# Patient Record
Sex: MALE | Race: BLACK OR AFRICAN AMERICAN | Employment: FULL TIME | URBAN - METROPOLITAN AREA
[De-identification: names, ages, dates, MRNs, and addresses within clinical notes are randomized per-mention and may not be internally consistent; named-entity substitution may affect disease eponyms.]

---

## 2021-10-28 ENCOUNTER — OFFICE VISIT (OUTPATIENT)
Dept: CARDIOLOGY CLINIC | Age: 51
End: 2021-10-28
Payer: COMMERCIAL

## 2021-10-28 VITALS
SYSTOLIC BLOOD PRESSURE: 140 MMHG | HEART RATE: 67 BPM | DIASTOLIC BLOOD PRESSURE: 88 MMHG | OXYGEN SATURATION: 99 % | BODY MASS INDEX: 34.72 KG/M2 | WEIGHT: 256 LBS

## 2021-10-28 DIAGNOSIS — I42.2 HYPERTROPHIC NONOBSTRUCTIVE CARDIOMYOPATHY (HCC): Primary | ICD-10-CM

## 2021-10-28 DIAGNOSIS — Q24.5: ICD-10-CM

## 2021-10-28 DIAGNOSIS — I10 ESSENTIAL HYPERTENSION: ICD-10-CM

## 2021-10-28 PROCEDURE — 99204 OFFICE O/P NEW MOD 45 MIN: CPT | Performed by: INTERNAL MEDICINE

## 2021-10-28 PROCEDURE — 93000 ELECTROCARDIOGRAM COMPLETE: CPT | Performed by: INTERNAL MEDICINE

## 2021-10-28 RX ORDER — AMLODIPINE AND OLMESARTAN MEDOXOMIL 5; 20 MG/1; MG/1
5-20 TABLET ORAL DAILY
COMMUNITY
End: 2022-05-06

## 2021-10-28 RX ORDER — INDOMETHACIN 75 MG/1
75 CAPSULE, EXTENDED RELEASE ORAL
COMMUNITY
End: 2022-05-06

## 2021-10-28 NOTE — PATIENT INSTRUCTIONS
Follow up with Dr Akash Cardoza with EKG in 1 year  ECHO - If you have not heard from the central scheduler to schedule your testing in 48 hours, please call [phone].

## 2021-10-28 NOTE — PROGRESS NOTES
Deo Barroso presents today for No chief complaint on file. Deo Barroso preferred language for health care discussion is english/other. Is someone accompanying this pt? no    Is the patient using any DME equipment during [address]? no    Depression Screening:  3 most recent PHQ Screens 10/28/2021   Little interest or pleasure in doing things Not at all   Feeling down, depressed, irritable, or hopeless Not at all   Total Score PHQ 2 0       Learning Assessment:  No flowsheet data found. Abuse Screening:  No flowsheet data found. Fall Risk  No flowsheet data found. Pt currently taking Anticoagulant therapy? no    Coordination of Care:  1. Have you been to the ER, urgent care clinic since your last visit? Hospitalized since your last visit? no    2. Have you seen or consulted any other health care providers outside of the [address] [place] [address] since your last visit? Include any pap smears or colon screening.  no

## 2021-10-28 NOTE — PROGRESS NOTES
HISTORY OF PRESENT ILLNESS  Ceci Grimaldo is a 46 y.o. male. HPI    Patient presents for a new office visit. He has a past medical history significant for hypertrophic nonobstructive cardiomyopathy initially diagnosed back in 2001 by echocardiogram.  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 The left main and left circumflex originated from the left coronary sinus. He had a normal-appearing right coronary artery, codominant in nature. He has not been evaluated by a cardiologist in nearly 10 years. He is retired from the [employer] and currently resides in [place] working for the [employer]. He states he did undergo a stress echocardiogram 1 to 2 years ago as part of a pre-employment physical which was reportedly normal.    He returns here today at the request of his PCP since he has not been evaluated in nearly 10 years by a cardiologist.  Patient states he was recently started on amlodipine/olmesartan by his PCP last week for elevated blood pressure. He reports his blood pressure has been better controlled since starting this medication. He denies any decreased activity tolerance, no exertional chest pain, exertional dyspnea, dizziness, nor syncope. He denies any heart palpitations. Past Medical History:   Diagnosis Date    Abnormal EKG     Abnormal nuclear cardiac imaging test 11/05/2001    No evidence of ischemia. Minimal anterior scar or artifact. Pos EKG on max EST. Ex time 12:29.      Gout     History of echocardiogram 05/23/2012    Tech difficult. EF 60-65%. No WMA. Mod LVH. Mild LAE. No significant valvular heart disease.  Hypertrophic nonobstructive cardiomyopathy ([address]     S/P cardiac cath 10/31/2001    Anomalous LAD. 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 Patent coronary arteries. LVEDP 25.  EF 65%. RA 7.  RV 41/7. PA 30/14. W 12.  CO 5.6.  Tilt table evaluation 11/02/2001    Negative study in resting state.  Vitamin D deficiency      Current Outpatient Medications   Medication Sig Dispense Refill    indomethacin SR (INDOCIN SR) 75 mg SR capsule Take 75 mg by mouth daily as needed.  amLODIPine-Olmesartan 5-20 mg tab Take 5-20 mg by mouth daily. No Known Allergies     Social History     Tobacco Use    Smoking status: Never Smoker    Smokeless tobacco: Never Used   Substance Use Topics    Alcohol use: Yes     Comment: Socially    Drug use: No     Family History   Problem Relation Age of Onset    Hypertension Maternal Grandmother    Daughter with hypertrophic cardiomyopathy requiring AICD. Review of Systems   Constitutional: Negative for chills, fever and weight loss. HENT: Negative for nosebleeds. Eyes: Negative for blurred vision and double vision. Respiratory: Negative for cough, shortness of breath and wheezing. Cardiovascular: Negative for chest pain, palpitations, orthopnea, claudication, leg swelling and PND. Gastrointestinal: Negative for abdominal pain, heartburn, nausea and vomiting. Genitourinary: Negative for dysuria and hematuria. Musculoskeletal: Negative for falls and myalgias. Skin: Negative for rash. Neurological: Negative for dizziness, focal weakness and headaches. Endo/Heme/Allergies: Does not bruise/bleed easily. Psychiatric/Behavioral: Negative for substance abuse. Visit Vitals  BP (!) 140/88   Pulse 67   Wt 116.1 kg (256 lb)   SpO2 99%   BMI 34.72 kg/m²         Physical Exam  Constitutional:       Appearance: He is well-developed. HENT:      Head: Normocephalic and atraumatic. Eyes:      Conjunctiva/sclera: Conjunctivae normal.   Neck:      Vascular: No carotid bruit or JVD. Cardiovascular:      Rate and Rhythm: Normal rate and regular rhythm. Pulses: Normal pulses. Heart sounds: S1 normal and S2 normal. No murmur heard. No gallop. No S3 sounds.     Pulmonary: Breath sounds: Normal breath sounds. No wheezing or rales. Abdominal:      General: Bowel sounds are normal.      Palpations: Abdomen is soft. Tenderness: There is no abdominal tenderness. Musculoskeletal:         General: No swelling or deformity. Cervical back: Neck supple. Skin:     General: Skin is warm and dry. Neurological:      General: No focal deficit present. Mental Status: He is alert and oriented to person, place, and time. Psychiatric:         Mood and Affect: Mood normal.         Behavior: Behavior normal.       EKG: Normal sinus rhythm, normal axis, first-degree AV block, possible left atrial enlargement, borderline voltage criteria for LVH, diffuse nonspecific ST-T abnormalities, likely due to repolarization changes. Prior to the previous EKG, the ST-T changes are less prominent. ASSESSMENT and PLAN  Encounter Diagnoses   Name Primary? 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 This was initially diagnosed 20 years ago. Patient remains asymptomatic. He does not have a heart murmur on exam today. I have recommended repeating a full resting echocardiogram to reevaluate his wall thickness and assess for any valvular heart disease. Essential hypertension. Patient's blood pressure has been elevated recently and he was started on amlodipine/olmesartan by his PCP. His blood pressure has been better controlled on this medication. I would continue this regimen. Anomalous origin of his LAD. This was an incidental finding during a cardiac catheterization in 2001. Patient denies any anginal type symptoms. Weight gain. Patient reports a 15 pound weight gain over the past 4 months.   He was encouraged to try and lose much weight as possible lifestyle modification. Follow-up annually, sooner if needed. Calcipotriene Counseling:  I discussed with the patient the risks of calcipotriene including but not limited to erythema, scaling, itching, and irritation.

## 2021-11-18 ENCOUNTER — HOSPITAL ENCOUNTER (OUTPATIENT)
Dept: NON INVASIVE DIAGNOSTICS | Age: 51
Discharge: HOME | End: 2021-11-18
Payer: COMMERCIAL

## 2021-11-18 VITALS
WEIGHT: 256 LBS | BODY MASS INDEX: 34.67 KG/M2 | HEIGHT: 72 IN | DIASTOLIC BLOOD PRESSURE: 88 MMHG | SYSTOLIC BLOOD PRESSURE: 140 MMHG

## 2021-11-18 DIAGNOSIS — I42.2 HYPERTROPHIC NONOBSTRUCTIVE CARDIOMYOPATHY (HCC): ICD-10-CM

## 2021-11-18 PROCEDURE — 93306 TTE W/DOPPLER COMPLETE: CPT | Performed by: SPECIALIST

## 2021-11-18 PROCEDURE — 93306 TTE W/DOPPLER COMPLETE: CPT

## 2021-11-20 LAB
ECHO AO ROOT DIAM: 3.4 CM
ECHO AV AREA PEAK VELOCITY: 2.98 CM2
ECHO AV AREA/BSA PEAK VELOCITY: 1.3 CM2/M2
ECHO AV PEAK GRADIENT: 6.99 MMHG
ECHO AV PEAK VELOCITY: 132.19 CM/S
ECHO LA AREA 4C: 32.68 CM2
ECHO LA MAJOR AXIS: 4.61 CM
ECHO LA MINOR AXIS: 1.95 CM
ECHO LA VOL 2C: 141.44 ML (ref 18–58)
ECHO LA VOL 4C: 121.21 ML (ref 18–58)
ECHO LA VOL BP: 139.94 ML (ref 18–58)
ECHO LA VOL/BSA BIPLANE: 59.05 ML/M2 (ref 16–28)
ECHO LA VOLUME INDEX A2C: 59.68 ML/M2 (ref 16–28)
ECHO LA VOLUME INDEX A4C: 51.14 ML/M2 (ref 16–28)
ECHO LV INTERNAL DIMENSION DIASTOLIC: 4.78 CM (ref 4.2–5.9)
ECHO LV INTERNAL DIMENSION SYSTOLIC: 3.03 CM
ECHO LV IVSD: 0.94 CM (ref 0.6–1)
ECHO LV IVSS: 1.18 CM
ECHO LV MASS 2D: 191.5 G (ref 88–224)
ECHO LV MASS INDEX 2D: 80.8 G/M2 (ref 49–115)
ECHO LV POSTERIOR WALL DIASTOLIC: 1.25 CM (ref 0.6–1)
ECHO LV POSTERIOR WALL SYSTOLIC: 1.15 CM
ECHO LVOT DIAM: 2.06 CM
ECHO LVOT PEAK GRADIENT: 5.6 MMHG
ECHO LVOT PEAK VELOCITY: 118.34 CM/S
ECHO MV A VELOCITY: 55.56 CM/S
ECHO MV E DECELERATION TIME (DT): 209.84 MS
ECHO MV E VELOCITY: 66.96 CM/S
ECHO MV E/A RATIO: 1.21
ECHO PV PEAK INSTANTANEOUS GRADIENT SYSTOLIC: 9.21 MMHG
ECHO RV INTERNAL DIMENSION: 4.58 CM
ECHO RV TAPSE: 2.46 CM (ref 1.5–2)
LA VOL DISK BP: 129.71 ML (ref 18–58)

## 2021-12-01 ENCOUNTER — TELEPHONE (OUTPATIENT)
Dept: CARDIOLOGY CLINIC | Age: 51
End: 2021-12-01

## 2021-12-01 NOTE — TELEPHONE ENCOUNTER
Patient made aware of echo results and Dr. Jazmyne Dietrich remarks. No questions or concerns at present.

## 2021-12-01 NOTE — TELEPHONE ENCOUNTER
----- Message from Hector Manriquez MD sent at 11/29/2021  5:22 PM EST -----  Please let the patient know that his echocardiogram was normal.  ----- Message -----  From: Lee Quick MD  Sent: 11/20/2021   6:28 PM EST  To: Hector Manriquez MD

## 2022-03-18 PROBLEM — I42.2 HYPERTROPHIC NONOBSTRUCTIVE CARDIOMYOPATHY (HCC): Status: ACTIVE | Noted: 2021-10-28

## 2022-03-19 PROBLEM — Q24.5: Status: ACTIVE | Noted: 2021-10-28

## 2022-03-19 PROBLEM — I10 ESSENTIAL HYPERTENSION: Status: ACTIVE | Noted: 2021-10-28

## 2022-05-04 ENCOUNTER — APPOINTMENT (OUTPATIENT)
Dept: CT IMAGING | Age: 52
DRG: 699 | End: 2022-05-04
Attending: FAMILY MEDICINE
Payer: COMMERCIAL

## 2022-05-04 ENCOUNTER — HOSPITAL ENCOUNTER (INPATIENT)
Age: 52
LOS: 2 days | Discharge: HOME | DRG: 699 | End: 2022-05-06
Attending: EMERGENCY MEDICINE | Admitting: FAMILY MEDICINE
Payer: COMMERCIAL

## 2022-05-04 DIAGNOSIS — E11.65 TYPE 2 DIABETES MELLITUS WITH HYPERGLYCEMIA, WITHOUT LONG-TERM CURRENT USE OF INSULIN (HCC): ICD-10-CM

## 2022-05-04 DIAGNOSIS — E87.5 ACUTE HYPERKALEMIA: Primary | ICD-10-CM

## 2022-05-04 DIAGNOSIS — N17.9 AKI (ACUTE KIDNEY INJURY) (HCC): ICD-10-CM

## 2022-05-04 LAB
ALBUMIN SERPL-MCNC: 3.2 G/DL (ref 3.5–5)
ALBUMIN SERPL-MCNC: 3.3 G/DL (ref 3.5–5)
ALBUMIN SERPL-MCNC: 3.8 G/DL (ref 3.5–5)
ALBUMIN/GLOB SERPL: 0.7 {RATIO} (ref 1.1–2.2)
ALBUMIN/GLOB SERPL: 0.8 {RATIO} (ref 1.1–2.2)
ALBUMIN/GLOB SERPL: 0.8 {RATIO} (ref 1.1–2.2)
ALP SERPL-CCNC: 376 U/L (ref 45–117)
ALP SERPL-CCNC: 395 U/L (ref 45–117)
ALP SERPL-CCNC: 430 U/L (ref 45–117)
ALT SERPL-CCNC: 36 U/L (ref 12–78)
ALT SERPL-CCNC: 42 U/L (ref 12–78)
ALT SERPL-CCNC: 44 U/L (ref 12–78)
ANION GAP SERPL CALC-SCNC: 10 MMOL/L (ref 5–15)
ANION GAP SERPL CALC-SCNC: 14 MMOL/L (ref 5–15)
ANION GAP SERPL CALC-SCNC: 9 MMOL/L (ref 5–15)
APPEARANCE UR: CLEAR
AST SERPL-CCNC: 21 U/L (ref 15–37)
AST SERPL-CCNC: 28 U/L (ref 15–37)
AST SERPL-CCNC: 54 U/L (ref 15–37)
BACTERIA URNS QL MICRO: NEGATIVE /HPF
BASOPHILS # BLD: 0.1 K/UL (ref 0–0.1)
BASOPHILS NFR BLD: 1 % (ref 0–1)
BILIRUB SERPL-MCNC: 0.5 MG/DL (ref 0.2–1)
BILIRUB SERPL-MCNC: 0.5 MG/DL (ref 0.2–1)
BILIRUB SERPL-MCNC: 0.6 MG/DL (ref 0.2–1)
BILIRUB UR QL: NEGATIVE
BUN SERPL-MCNC: 32 MG/DL (ref 6–20)
BUN SERPL-MCNC: 35 MG/DL (ref 6–20)
BUN SERPL-MCNC: 39 MG/DL (ref 6–20)
BUN/CREAT SERPL: 19 (ref 12–20)
BUN/CREAT SERPL: 20 (ref 12–20)
BUN/CREAT SERPL: 21 (ref 12–20)
CALCIUM SERPL-MCNC: 8.2 MG/DL (ref 8.5–10.1)
CALCIUM SERPL-MCNC: 9.2 MG/DL (ref 8.5–10.1)
CALCIUM SERPL-MCNC: 9.6 MG/DL (ref 8.5–10.1)
CHLORIDE SERPL-SCNC: 102 MMOL/L (ref 97–108)
CHLORIDE SERPL-SCNC: 106 MMOL/L (ref 97–108)
CHLORIDE SERPL-SCNC: 98 MMOL/L (ref 97–108)
CK SERPL-CCNC: 147 U/L (ref 39–308)
CO2 SERPL-SCNC: 17 MMOL/L (ref 21–32)
CO2 SERPL-SCNC: 19 MMOL/L (ref 21–32)
CO2 SERPL-SCNC: 19 MMOL/L (ref 21–32)
COLOR UR: ABNORMAL
COMMENT, HOLDF: NORMAL
CREAT SERPL-MCNC: 1.49 MG/DL (ref 0.7–1.3)
CREAT SERPL-MCNC: 1.85 MG/DL (ref 0.7–1.3)
CREAT SERPL-MCNC: 1.99 MG/DL (ref 0.7–1.3)
DIFFERENTIAL METHOD BLD: NORMAL
EOSINOPHIL # BLD: 0.2 K/UL (ref 0–0.4)
EOSINOPHIL NFR BLD: 2 % (ref 0–7)
EPITH CASTS URNS QL MICRO: ABNORMAL /LPF
ERYTHROCYTE [DISTWIDTH] IN BLOOD BY AUTOMATED COUNT: 13.2 % (ref 11.5–14.5)
EST. AVERAGE GLUCOSE BLD GHB EST-MCNC: 280 MG/DL
GLOBULIN SER CALC-MCNC: 4.2 G/DL (ref 2–4)
GLOBULIN SER CALC-MCNC: 4.7 G/DL (ref 2–4)
GLOBULIN SER CALC-MCNC: 4.8 G/DL (ref 2–4)
GLUCOSE BLD STRIP.AUTO-MCNC: 300 MG/DL (ref 65–117)
GLUCOSE BLD STRIP.AUTO-MCNC: 377 MG/DL (ref 65–117)
GLUCOSE BLD STRIP.AUTO-MCNC: 388 MG/DL (ref 65–117)
GLUCOSE SERPL-MCNC: 254 MG/DL (ref 65–100)
GLUCOSE SERPL-MCNC: 397 MG/DL (ref 65–100)
GLUCOSE SERPL-MCNC: 461 MG/DL (ref 65–100)
GLUCOSE UR STRIP.AUTO-MCNC: >1000 MG/DL
HBA1C MFR BLD: 11.4 % (ref 4–5.6)
HCT VFR BLD AUTO: 45.8 % (ref 36.6–50.3)
HGB BLD-MCNC: 14.9 G/DL (ref 12.1–17)
HGB UR QL STRIP: NEGATIVE
IMM GRANULOCYTES # BLD AUTO: 0 K/UL (ref 0–0.04)
IMM GRANULOCYTES NFR BLD AUTO: 0 % (ref 0–0.5)
KETONES UR QL STRIP.AUTO: 15 MG/DL
LEUKOCYTE ESTERASE UR QL STRIP.AUTO: NEGATIVE
LYMPHOCYTES # BLD: 1.7 K/UL (ref 0.8–3.5)
LYMPHOCYTES NFR BLD: 20 % (ref 12–49)
MAGNESIUM SERPL-MCNC: 2.2 MG/DL (ref 1.6–2.4)
MCH RBC QN AUTO: 27.1 PG (ref 26–34)
MCHC RBC AUTO-ENTMCNC: 32.5 G/DL (ref 30–36.5)
MCV RBC AUTO: 83.4 FL (ref 80–99)
MONOCYTES # BLD: 0.8 K/UL (ref 0–1)
MONOCYTES NFR BLD: 10 % (ref 5–13)
NEUTS SEG # BLD: 5.6 K/UL (ref 1.8–8)
NEUTS SEG NFR BLD: 67 % (ref 32–75)
NITRITE UR QL STRIP.AUTO: NEGATIVE
NRBC # BLD: 0 K/UL (ref 0–0.01)
NRBC BLD-RTO: 0 PER 100 WBC
PH UR STRIP: 5 [PH] (ref 5–8)
PHOSPHATE SERPL-MCNC: 3.1 MG/DL (ref 2.6–4.7)
PLATELET # BLD AUTO: 198 K/UL (ref 150–400)
PMV BLD AUTO: NORMAL FL (ref 8.9–12.9)
POTASSIUM SERPL-SCNC: 4.1 MMOL/L (ref 3.5–5.1)
POTASSIUM SERPL-SCNC: 5.7 MMOL/L (ref 3.5–5.1)
POTASSIUM SERPL-SCNC: 7.6 MMOL/L (ref 3.5–5.1)
PROT SERPL-MCNC: 7.5 G/DL (ref 6.4–8.2)
PROT SERPL-MCNC: 7.9 G/DL (ref 6.4–8.2)
PROT SERPL-MCNC: 8.6 G/DL (ref 6.4–8.2)
PROT UR STRIP-MCNC: ABNORMAL MG/DL
RBC # BLD AUTO: 5.49 M/UL (ref 4.1–5.7)
RBC #/AREA URNS HPF: ABNORMAL /HPF (ref 0–5)
RBC MORPH BLD: NORMAL
SAMPLES BEING HELD,HOLD: NORMAL
SERVICE CMNT-IMP: ABNORMAL
SODIUM SERPL-SCNC: 129 MMOL/L (ref 136–145)
SODIUM SERPL-SCNC: 130 MMOL/L (ref 136–145)
SODIUM SERPL-SCNC: 135 MMOL/L (ref 136–145)
SP GR UR REFRACTOMETRY: 1.02 (ref 1–1.03)
UR CULT HOLD, URHOLD: NORMAL
UROBILINOGEN UR QL STRIP.AUTO: 0.2 EU/DL (ref 0.2–1)
WBC # BLD AUTO: 8.4 K/UL (ref 4.1–11.1)
WBC URNS QL MICRO: ABNORMAL /HPF (ref 0–4)

## 2022-05-04 PROCEDURE — 83036 HEMOGLOBIN GLYCOSYLATED A1C: CPT

## 2022-05-04 PROCEDURE — 82550 ASSAY OF CK (CPK): CPT

## 2022-05-04 PROCEDURE — 82962 GLUCOSE BLOOD TEST: CPT

## 2022-05-04 PROCEDURE — 80053 COMPREHEN METABOLIC PANEL: CPT

## 2022-05-04 PROCEDURE — 36415 COLL VENOUS BLD VENIPUNCTURE: CPT

## 2022-05-04 PROCEDURE — 81001 URINALYSIS AUTO W/SCOPE: CPT

## 2022-05-04 PROCEDURE — 99285 EMERGENCY DEPT VISIT HI MDM: CPT

## 2022-05-04 PROCEDURE — 74011250636 HC RX REV CODE- 250/636: Performed by: FAMILY MEDICINE

## 2022-05-04 PROCEDURE — 83735 ASSAY OF MAGNESIUM: CPT

## 2022-05-04 PROCEDURE — 74176 CT ABD & PELVIS W/O CONTRAST: CPT

## 2022-05-04 PROCEDURE — 93005 ELECTROCARDIOGRAM TRACING: CPT

## 2022-05-04 PROCEDURE — 85025 COMPLETE CBC W/AUTO DIFF WBC: CPT

## 2022-05-04 PROCEDURE — 96375 TX/PRO/DX INJ NEW DRUG ADDON: CPT

## 2022-05-04 PROCEDURE — 65270000046 HC RM TELEMETRY

## 2022-05-04 PROCEDURE — 84100 ASSAY OF PHOSPHORUS: CPT

## 2022-05-04 PROCEDURE — 96365 THER/PROPH/DIAG IV INF INIT: CPT

## 2022-05-04 PROCEDURE — 74011000250 HC RX REV CODE- 250: Performed by: FAMILY MEDICINE

## 2022-05-04 PROCEDURE — 74011636637 HC RX REV CODE- 636/637: Performed by: FAMILY MEDICINE

## 2022-05-04 RX ORDER — SODIUM CHLORIDE 0.9 % (FLUSH) 0.9 %
5-40 SYRINGE (ML) INJECTION EVERY 8 HOURS
Status: DISCONTINUED | OUTPATIENT
Start: 2022-05-04 | End: 2022-05-06 | Stop reason: HOSPADM

## 2022-05-04 RX ORDER — SODIUM POLYSTYRENE SULFONATE 15 G/60ML
30 SUSPENSION ORAL; RECTAL
Status: ACTIVE | OUTPATIENT
Start: 2022-05-04 | End: 2022-05-05

## 2022-05-04 RX ORDER — MAGNESIUM SULFATE 100 %
4 CRYSTALS MISCELLANEOUS AS NEEDED
Status: DISCONTINUED | OUTPATIENT
Start: 2022-05-04 | End: 2022-05-06 | Stop reason: HOSPADM

## 2022-05-04 RX ORDER — CALCIUM GLUCONATE 94 MG/ML
1 INJECTION, SOLUTION INTRAVENOUS
Status: DISCONTINUED | OUTPATIENT
Start: 2022-05-04 | End: 2022-05-04

## 2022-05-04 RX ORDER — SODIUM CHLORIDE 0.9 % (FLUSH) 0.9 %
5-40 SYRINGE (ML) INJECTION AS NEEDED
Status: DISCONTINUED | OUTPATIENT
Start: 2022-05-04 | End: 2022-05-06 | Stop reason: HOSPADM

## 2022-05-04 RX ORDER — INSULIN LISPRO 100 [IU]/ML
INJECTION, SOLUTION INTRAVENOUS; SUBCUTANEOUS EVERY 6 HOURS
Status: DISCONTINUED | OUTPATIENT
Start: 2022-05-04 | End: 2022-05-05

## 2022-05-04 RX ORDER — CALCIUM GLUCONATE 20 MG/ML
1 INJECTION, SOLUTION INTRAVENOUS ONCE
Status: COMPLETED | OUTPATIENT
Start: 2022-05-04 | End: 2022-05-04

## 2022-05-04 RX ADMIN — Medication 10 UNITS: at 17:21

## 2022-05-04 RX ADMIN — Medication 10 UNITS: at 15:03

## 2022-05-04 RX ADMIN — SODIUM CHLORIDE 1000 ML: 9 INJECTION, SOLUTION INTRAVENOUS at 15:05

## 2022-05-04 RX ADMIN — SODIUM BICARBONATE: 84 INJECTION, SOLUTION INTRAVENOUS at 20:28

## 2022-05-04 RX ADMIN — CALCIUM GLUCONATE 1000 MG: 20 INJECTION, SOLUTION INTRAVENOUS at 17:22

## 2022-05-04 RX ADMIN — SODIUM CHLORIDE 1000 ML: 9 INJECTION, SOLUTION INTRAVENOUS at 13:42

## 2022-05-04 NOTE — ED NOTES
Bedside and Verbal shift change report given to Gage RN (oncoming nurse) by Yamel Minor RN (offgoing nurse). Report included the following information SBAR, ED Summary, MAR and Recent Results.

## 2022-05-04 NOTE — H&P
[address] [facility] Adult  Hospitalist Group  History and Physical    Date of Service:  5/4/2022  Primary Care Provider: Denise Wilcox MD  Source of information: The patient and Chart review    Chief Complaint: High Blood Sugar      History of Presenting Illness:   Little Dykes is a 46 y.o. male who presents with abnormal labs    History was primarily obtained from the patient    Patient reports that he started having some weakness, he gained some weight, has polydipsia and polyuria, started feeling poorly, got concerned and [facility], was found to have elevated blood glucose and was told to come to the ER. Meryle Sandman Patient reports he was diagnosed with diabetes but was able to control it with lifestyle modification, reports that in last November his hemoglobin A1c was 6 and his fasting glucose was within normal limits, he was taken off all medications, reports that in the last 3 months, he has been noncompliant with diet and exercise modification, reports that he retired and has been eating quite a bit, reports that he has gained 20 pounds,. Came to the ER today, was found to be hyperglycemic and in acute renal failure and was requested to be admitted to the hospitalist service         REVIEW OF SYSTEMS:  A comprehensive review of systems was negative except for that written in the History of Present Illness. Past Medical History:   Diagnosis Date    Abnormal EKG     Abnormal nuclear cardiac imaging test 11/05/2001    No evidence of ischemia. Minimal anterior scar or artifact. Pos EKG on max EST. Ex time 12:29.      Gout     History of echocardiogram 05/23/2012    Tech difficult. EF 60-65%. No WMA. Mod LVH. Mild LAE. No significant valvular heart disease. 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 Patent coronary arteries. LVEDP 25.  EF 65%. RA 7.  RV 41/7. PA 30/14. W 12.  CO 5.6.  Tilt table evaluation 11/02/2001    Negative study in resting state.  Vitamin D deficiency       Past Surgical History:   Procedure Laterality Date    HX CHOLECYSTECTOMY  April 2001    HX HEART CATHETERIZATION  10/31/2011    HX OTHER SURGICAL  1983    Right knee surgery    [place] REMOVAL GALLBL[place]DER  4/2000     Prior to Admission medications    Medication Sig Start Date End Date Taking? Authorizing Provider   indomethacin SR (INDOCIN SR) 75 mg SR capsule Take 75 mg by mouth daily as needed. Provider, Historical   amLODIPine-Olmesartan 5-20 mg tab Take 5-20 mg by mouth daily. Provider, Historical     No Known Allergies   Family History   Problem Relation Age of Onset    Hypertension Maternal Grandmother       Social History:  reports that he has never smoked. He has never used smokeless tobacco. He reports current alcohol use. He reports that he does not use drugs. Family and social history were personally reviewed, all pertinent and relevant details are outlined as above. Objective:     Visit Vitals  BP (!) 152/86   Pulse 68   Temp 98.1 °F (36.7 °C)   Resp 14   Ht 6' (1.829 m)   Wt 113.4 kg (250 lb)   SpO2 100%   BMI 33.91 kg/m²      O2 Device: None (Room air)    PHYSICAL EXAM:   General: Alert x oriented x 3, awake, no acute distress, resting in bed, pleasant male, appears to be stated age  HEENT: PEERL, EOMI, moist mucus membranes  Neck: Supple, no JVD, no meningeal signs  Chest: Clear to auscultation bilaterally   CVS: RRR, S1 S2 heard, no murmurs/rubs/gallops  Abd: Soft, non-tender, non-distended, +bowel sounds   Ext: No clubbing, no cyanosis, no edema  Neuro/Psych: No focal neurological deficit  Cap refill: Brisk, less than 3 seconds  Pulses: 2+, symmetric in all extremities  Skin: Warm, dry, without rashes or lesions    Data Review: All diagnostic labs and studies have been reviewed.     Abnormal Labs Reviewed   METABOLIC PANEL, COMPREHENSIVE - Abnormal; Notable for the following components:       Result Value    Sodium 129 (*)     Potassium 5.7 (*)     CO2 17 (*)     Glucose 461 (*)     BUN 39 (*)     Creatinine 1.99 (*)     GFR est AA 43 (*)     GFR est non-AA 35 (*)     Alk. phosphatase 430 (*)     Protein, total 8.6 (*)     Globulin 4.8 (*)     A-G Ratio 0.8 (*)     All other components within normal limits   URINALYSIS W/MICROSCOPIC - Abnormal; Notable for the following components:    Protein TRACE (*)     Glucose >1,000 (*)     Ketone 15 (*)     All other components within normal limits   METABOLIC PANEL, COMPREHENSIVE - Abnormal; Notable for the following components:    Sodium 130 (*)     Potassium 7.6 (*)     CO2 19 (*)     Glucose 397 (*)     BUN 35 (*)     Creatinine 1.85 (*)     GFR est AA 47 (*)     GFR est non-AA 38 (*)     Calcium 8.2 (*)     AST (SGOT) 54 (*)     Alk. phosphatase 376 (*)     Albumin 3.2 (*)     Globulin 4.7 (*)     A-G Ratio 0.7 (*)     All other components within normal limits   GLUCOSE, POC - Abnormal; Notable for the following components:    Glucose (POC) 388 (*)     All other components within normal limits   GLUCOSE, POC - Abnormal; Notable for the following components:    Glucose (POC) 377 (*)     All other components within normal limits       All Micro Results     Procedure Component Value Units Date/Time    URINE CULTURE HOLD SAMPLE [[id number]] Collected: 05/04/22 1337    Order Status: Completed Specimen: Urine from Serum Updated: 05/04/22 1342     Urine culture hold       Urine on hold in Microbiology dept for 2 days. If unpreserved urine is submitted, it cannot be used for addtional testing after 24 hours, recollection will be required. IMAGING:   CT ABD PELV WO CONT   Final Result   No acute process on noncontrast CT. No nephrolithiasis or hydronephrosis.            ECG/ECHO:    Results for orders placed or performed during the hospital encounter of 05/04/22   EKG, 12 LEAD, INITIAL   Result Value Ref Range    Ventricular Rate 63 BPM    Atrial Rate 63 BPM    P-R Interval 240 ms    QRS Duration 96 ms    Q-T Interval 402 ms    QTC Calculation (Bezet) 411 ms    Calculated R Axis -13 degrees    Calculated T Axis -179 degrees    Diagnosis       Sinus rhythm with 1st degree AV block  Anterior infarct , age undetermined  T wave abnormality, consider lateral ischemia  Abnormal ECG  No previous ECGs available     Results for orders placed or performed in visit on 10/28/21   AMB POC EKG ROUTINE W/ 12 LEADS, INTER & REP    Impression    See progress note. Assessment:   Given the patient's current clinical presentation, there is a high level of concern for decompensation if discharged from the emergency department. Complex decision making was performed, which includes reviewing the patient's available past medical records, laboratory results, and imaging studies. Acute kidney injury  Hyperkalemia  Diabetes mellitus type 2 with poor control and hypoglycemia  Normal anion gap metabolic acidosis  Accelerated hypertension  Plan:   Patient will be admitted on a telemetry bed  ? Diabetic nephropathy, CT of the abdomen pelvis pending, gentle IV hydration with bicarb GTT, avoid nephrotoxic medication, renally dose all medication, trend creatinine, nephrology consulted by ER, await input content, continue to monitor  Patient received calcium gluconate insulin and dextrose in the ER, also receiving Kayexalate, continue bicarb GTT, recheck BMP, nephrology input awaited, continue to monitor, the need for HD to be decided by nephrology, monitor  Sliding-scale insulin, Accu-Cheks, diet control, close monitoring, further intervention per hospital course  Likely secondary above, patient on bicarb GTT,   hydralazine as needed        DIET: ADULT DIET Regular; 4 carb choices (60 gm/meal); Low Fat/Low Chol/High Fiber/KASI; Low Sodium (2 gm); Low Potassium (Less than 3000 mg/day);  Low Phosphorus (Less than 1000 mg); 60 to 80 gm   ISOLATION PRECAUTIONS: There are currently no Active Isolations  CODE STATUS: Full Code   DVT PROPHYLAXIS: SCDs  FUNCTIONAL STATUS PRIOR TO HOSPITALIZATION: Ambulatory and capable of self-care but relies on assistive devices (rolling walker/cane). EARLY MOBILITY ASSESSMENT: Recommend a consult to the Mobility Team  ANTICIPATED DISCHARGE: 24-48 hours. Signed By: Franklin Avila MD     May 4, 2022         Please note that this dictation may have been completed with Dragon, the computer voice recognition software. Quite often unanticipated grammatical, syntax, homophones, and other interpretive errors are inadvertently transcribed by the computer software. Please disregard these errors. Please excuse any errors that have escaped final proofreading.

## 2022-05-04 NOTE — ED TRIAGE NOTES
[facility] with [facility] BS, no hx of diabetes, does not take steroids, , pt feels thirsty , dehydrated with fatigued, denies fever

## 2022-05-04 NOTE — ED PROVIDER NOTES
Patient is a 22-year-old male with past medical history of hypertension, hypertrophic cardiomyopathy presents for evaluation of elevated blood sugar. He reports that over the past few months, he has been eating unhealthy and has gained some weight, approximately 20 pounds. Over the past few weeks, he noted that he began feeling poorly. Over the past 2 to 3 days, his symptoms worsened. He complains of dry mouth, thirst, frequent urination and generalized feeling unwell. [facility] and was told his blood sugar was elevated and was referred here for further evaluation and management. He reports that in 2016, he had an episode like this where he was diagnosed with diabetes. He was temporarily on insulin, but lost weight and made lifestyle changes and was ultimately taken off of the insulin. He reports that his last physical revealed a A1c of approximately 6 and he has not been on any medication for diabetes. He denies any recent steroid use or new medications. Past Medical History:   Diagnosis Date    Abnormal EKG     Abnormal nuclear cardiac imaging test 11/05/2001    No evidence of ischemia. Minimal anterior scar or artifact. Pos EKG on max EST. Ex time 12:29.      Gout     History of echocardiogram 05/23/2012    Tech difficult. EF 60-65%. No WMA. Mod LVH. Mild LAE. No significant valvular heart disease. 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 Patent coronary arteries. LVEDP 25.  EF 65%. RA 7.  RV 41/7. PA 30/14. W 12.  CO 5.6.  Tilt table evaluation 11/02/2001    Negative study in resting state.     Vitamin D deficiency        Past Surgical History:   Procedure Laterality Date    HX CHOLECYSTECTOMY  April 2001    HX HEART CATHETERIZATION  10/31/2011    HX OTHER SURGICAL  1983    Right knee surgery    [place] REMOVAL GALLBLADDER  4/2000 Family History:   Problem Relation Age of Onset    Hypertension Maternal Grandmother        Social History     Socioeconomic History    Marital status:      Spouse name: Not on file    Number of children: Not on file    Years of education: Not on file    Highest education level: Not on file   Occupational History    Not on file   Tobacco Use    Smoking status: Never Smoker    Smokeless tobacco: Never Used   Substance and Sexual Activity    Alcohol use: Yes     Comment: Socially    Drug use: No    Sexual activity: Not on file   Other Topics Concern    Not on file   Social History Narrative    Not on file     Social Determinants of Health     Financial Resource Strain:     Difficulty of Paying Living Expenses: Not on file   Food Insecurity:     Worried About Running Out of Food in the Last Year: Not on file    Sharif of Food in the Last Year: Not on file   Transportation Needs:     Lack of Transportation (Medical): Not on file    Lack of Transportation (Non-Medical):  Not on file   Physical Activity:     Days of Exercise per Week: Not on file    Minutes of Exercise per Session: Not on file   Stress:     Feeling of Stress : Not on file   Social Connections:     Frequency of Communication with Friends and Family: Not on file    Frequency of Social Gatherings with Friends and Family: Not on file    Attends Restorationism Services: Not on file    Active Member of [address] [place] [address] [place] or Organizations: Not on file    Attends Club or Organization Meetings: Not on file    Marital Status: Not on file   Intimate Partner Violence:     Fear of Current or Ex-Partner: Not on file    Emotionally Abused: Not on file    Physically Abused: Not on file    Sexually Abused: Not on file   Housing Stability:     Unable to Pay for Housing in the Last Year: Not on file    Number of [place] in the Last Year: Not on file    Unstable Housing in the Last Year: Not on file         ALLERGIES: Patient has no known allergies. Review of Systems   Constitutional: Negative for unexpected weight change. HENT: Negative for congestion. Eyes: Negative for visual disturbance. Respiratory: Negative for cough, chest tightness and shortness of breath. Cardiovascular: Negative for chest pain. Gastrointestinal: Negative for abdominal pain. Endocrine: Positive for polydipsia and polyuria. Genitourinary: Positive for frequency. Negative for dysuria, flank pain and urgency. Musculoskeletal: Negative for back pain. Skin: Negative for color change. Allergic/Immunologic: Negative for immunocompromised state. Neurological: Negative for dizziness and headaches. Hematological: Negative for adenopathy. Psychiatric/Behavioral: Negative for agitation. Vitals:    05/04/22 1206   BP: 129/78   Pulse: 75   Resp: 16   Temp: 97.8 °F (36.6 °C)   SpO2: 99%            Physical Exam  Vitals and nursing note reviewed. Constitutional:       Appearance: Normal appearance. He is obese. HENT:      Head: Atraumatic. Eyes:      Conjunctiva/sclera: Conjunctivae normal.      Pupils: Pupils are equal, round, and reactive to light. Cardiovascular:      Rate and Rhythm: Normal rate and regular rhythm. Pulses: Normal pulses. Heart sounds: Normal heart sounds. Pulmonary:      Effort: Pulmonary effort is normal.      Breath sounds: Normal breath sounds. Abdominal:      General: Abdomen is flat. Bowel sounds are normal.      Tenderness: There is no abdominal tenderness. Musculoskeletal:         General: Normal range of motion. Cervical back: Neck supple. Skin:     General: Skin is warm and dry. Capillary Refill: Capillary refill takes less than 2 seconds. Neurological:      General: No focal deficit present. Mental Status: He is alert and oriented to person, place, and time. Mental status is at baseline.    Psychiatric:         Mood and Affect: Mood normal.         Behavior: Behavior normal. MDM  Number of Diagnoses or Management Options  Acute hyperkalemia  SHAYLEE (acute kidney injury) ([facility]  Diagnosis management comments: Patient presenting with complaint of hyperglycemia, symptomatic. Work-up significant for elevated blood sugar, creatinine 1.85, potassium of 7.6. Hyperkalemia order set placed, EKG obtained. Patient placed on cardiac monitor. Will consult hospitalist for admission and further work-up. Amount and/or Complexity of Data Reviewed  Clinical lab tests: ordered and reviewed  Tests in the radiology section of CPT®: ordered and reviewed  Decide to obtain previous medical records or to obtain history from someone other than the patient: yes  Discuss the patient with other providers: yes    Patient Progress  Patient progress: stable         Procedures                 Perfect Serve Consult for Admission  4:20 PM    ED Room Number: ER18/18  Patient Name and age:  Garrick Beckford 46 y.o.  male  Working Diagnosis:   1. Acute hyperkalemia    2. SHAYLEE (acute kidney injury) ([facility]        COVID-19 Suspicion:  no  Sepsis present:  no  Reassessment needed: no  Code Status:  Full Code  Readmission: no  Isolation Requirements:  no  Recommended Level of Care:  telemetry  Department:  [facility] Adult ED - 21     Other: 47-year-old male with past medical history of hypertension, hypertrophic cardiomyopathy presents from urgent care for evaluation of hyperglycemia. Reports that over the past few days, has been feeling generalized weakness, thirst, urinary frequency. Blood sugar noted to be 400s, no anion gap. Labs significant for potassium of 7.6, creatinine of 1.85.

## 2022-05-05 LAB
ALBUMIN SERPL-MCNC: 3 G/DL (ref 3.5–5)
ALBUMIN/GLOB SERPL: 0.9 {RATIO} (ref 1.1–2.2)
ALP SERPL-CCNC: 368 U/L (ref 45–117)
ALT SERPL-CCNC: 32 U/L (ref 12–78)
ANION GAP SERPL CALC-SCNC: 9 MMOL/L (ref 5–15)
AST SERPL-CCNC: 19 U/L (ref 15–37)
ATRIAL RATE: 63 BPM
BASOPHILS # BLD: 0 K/UL (ref 0–0.1)
BASOPHILS NFR BLD: 0 % (ref 0–1)
BILIRUB SERPL-MCNC: 0.4 MG/DL (ref 0.2–1)
BUN SERPL-MCNC: 29 MG/DL (ref 6–20)
BUN/CREAT SERPL: 20 (ref 12–20)
CALCIUM SERPL-MCNC: 8.7 MG/DL (ref 8.5–10.1)
CALCULATED R AXIS, ECG10: -13 DEGREES
CALCULATED T AXIS, ECG11: -179 DEGREES
CHLORIDE SERPL-SCNC: 104 MMOL/L (ref 97–108)
CK SERPL-CCNC: 135 U/L (ref 39–308)
CO2 SERPL-SCNC: 21 MMOL/L (ref 21–32)
CREAT SERPL-MCNC: 1.47 MG/DL (ref 0.7–1.3)
CREAT UR-MCNC: 85.6 MG/DL
DIAGNOSIS, 93000: NORMAL
DIFFERENTIAL METHOD BLD: NORMAL
EOSINOPHIL # BLD: 0.3 K/UL (ref 0–0.4)
EOSINOPHIL NFR BLD: 4 % (ref 0–7)
ERYTHROCYTE [DISTWIDTH] IN BLOOD BY AUTOMATED COUNT: 13.1 % (ref 11.5–14.5)
GLOBULIN SER CALC-MCNC: 3.4 G/DL (ref 2–4)
GLUCOSE BLD STRIP.AUTO-MCNC: 169 MG/DL (ref 65–117)
GLUCOSE BLD STRIP.AUTO-MCNC: 255 MG/DL (ref 65–117)
GLUCOSE BLD STRIP.AUTO-MCNC: 296 MG/DL (ref 65–117)
GLUCOSE BLD STRIP.AUTO-MCNC: 310 MG/DL (ref 65–117)
GLUCOSE BLD STRIP.AUTO-MCNC: 317 MG/DL (ref 65–117)
GLUCOSE BLD STRIP.AUTO-MCNC: 388 MG/DL (ref 65–117)
GLUCOSE SERPL-MCNC: 333 MG/DL (ref 65–100)
HCT VFR BLD AUTO: 37.9 % (ref 36.6–50.3)
HGB BLD-MCNC: 12.8 G/DL (ref 12.1–17)
IMM GRANULOCYTES # BLD AUTO: 0 K/UL (ref 0–0.04)
IMM GRANULOCYTES NFR BLD AUTO: 0 % (ref 0–0.5)
LYMPHOCYTES # BLD: 2 K/UL (ref 0.8–3.5)
LYMPHOCYTES NFR BLD: 26 % (ref 12–49)
MCH RBC QN AUTO: 27.8 PG (ref 26–34)
MCHC RBC AUTO-ENTMCNC: 33.8 G/DL (ref 30–36.5)
MCV RBC AUTO: 82.2 FL (ref 80–99)
MONOCYTES # BLD: 0.8 K/UL (ref 0–1)
MONOCYTES NFR BLD: 11 % (ref 5–13)
NEUTS SEG # BLD: 4.4 K/UL (ref 1.8–8)
NEUTS SEG NFR BLD: 58 % (ref 32–75)
NRBC # BLD: 0 K/UL (ref 0–0.01)
NRBC BLD-RTO: 0 PER 100 WBC
P-R INTERVAL, ECG05: 240 MS
PLATELET # BLD AUTO: 172 K/UL (ref 150–400)
POTASSIUM SERPL-SCNC: 4.3 MMOL/L (ref 3.5–5.1)
PROT SERPL-MCNC: 6.4 G/DL (ref 6.4–8.2)
PROT UR-MCNC: 27 MG/DL (ref 0–11.9)
PROT/CREAT UR-RTO: 0.3
Q-T INTERVAL, ECG07: 402 MS
QRS DURATION, ECG06: 96 MS
QTC CALCULATION (BEZET), ECG08: 411 MS
RBC # BLD AUTO: 4.61 M/UL (ref 4.1–5.7)
SERVICE CMNT-IMP: ABNORMAL
SODIUM SERPL-SCNC: 134 MMOL/L (ref 136–145)
VENTRICULAR RATE, ECG03: 63 BPM
WBC # BLD AUTO: 7.6 K/UL (ref 4.1–11.1)

## 2022-05-05 PROCEDURE — 82550 ASSAY OF CK (CPK): CPT

## 2022-05-05 PROCEDURE — 74011000250 HC RX REV CODE- 250: Performed by: FAMILY MEDICINE

## 2022-05-05 PROCEDURE — 74011636637 HC RX REV CODE- 636/637: Performed by: FAMILY MEDICINE

## 2022-05-05 PROCEDURE — 36415 COLL VENOUS BLD VENIPUNCTURE: CPT

## 2022-05-05 PROCEDURE — 82962 GLUCOSE BLOOD TEST: CPT

## 2022-05-05 PROCEDURE — 99233 SBSQ HOSP IP/OBS HIGH 50: CPT | Performed by: CLINICAL NURSE SPECIALIST

## 2022-05-05 PROCEDURE — 65270000046 HC RM TELEMETRY

## 2022-05-05 PROCEDURE — 85025 COMPLETE CBC W/AUTO DIFF WBC: CPT

## 2022-05-05 PROCEDURE — 84156 ASSAY OF PROTEIN URINE: CPT

## 2022-05-05 PROCEDURE — 80053 COMPREHEN METABOLIC PANEL: CPT

## 2022-05-05 RX ORDER — INSULIN GLARGINE 100 [IU]/ML
15 INJECTION, SOLUTION SUBCUTANEOUS DAILY
Status: DISCONTINUED | OUTPATIENT
Start: 2022-05-05 | End: 2022-05-05

## 2022-05-05 RX ORDER — INSULIN LISPRO 100 [IU]/ML
INJECTION, SOLUTION INTRAVENOUS; SUBCUTANEOUS
Status: DISCONTINUED | OUTPATIENT
Start: 2022-05-05 | End: 2022-05-06 | Stop reason: HOSPADM

## 2022-05-05 RX ORDER — INSULIN LISPRO 100 [IU]/ML
5 INJECTION, SOLUTION INTRAVENOUS; SUBCUTANEOUS
Status: DISCONTINUED | OUTPATIENT
Start: 2022-05-05 | End: 2022-05-06 | Stop reason: HOSPADM

## 2022-05-05 RX ORDER — INSULIN GLARGINE 100 [IU]/ML
20 INJECTION, SOLUTION SUBCUTANEOUS
Status: DISCONTINUED | OUTPATIENT
Start: 2022-05-05 | End: 2022-05-06 | Stop reason: HOSPADM

## 2022-05-05 RX ADMIN — Medication 6 UNITS: at 21:53

## 2022-05-05 RX ADMIN — Medication 6 UNITS: at 17:42

## 2022-05-05 RX ADMIN — Medication 7 UNITS: at 06:59

## 2022-05-05 RX ADMIN — SODIUM BICARBONATE: 84 INJECTION, SOLUTION INTRAVENOUS at 21:55

## 2022-05-05 RX ADMIN — Medication 5 UNITS: at 09:13

## 2022-05-05 RX ADMIN — Medication 5 UNITS: at 13:11

## 2022-05-05 RX ADMIN — SODIUM BICARBONATE: 84 INJECTION, SOLUTION INTRAVENOUS at 08:47

## 2022-05-05 RX ADMIN — SODIUM CHLORIDE, PRESERVATIVE FREE 10 ML: 5 INJECTION INTRAVENOUS at 07:00

## 2022-05-05 RX ADMIN — INSULIN GLARGINE 20 UNITS: 100 INJECTION, SOLUTION SUBCUTANEOUS at 23:44

## 2022-05-05 RX ADMIN — INSULIN GLARGINE 15 UNITS: 100 INJECTION, SOLUTION SUBCUTANEOUS at 09:14

## 2022-05-05 RX ADMIN — SODIUM CHLORIDE, PRESERVATIVE FREE 10 ML: 5 INJECTION INTRAVENOUS at 13:13

## 2022-05-05 RX ADMIN — Medication 5 UNITS: at 17:42

## 2022-05-05 RX ADMIN — Medication 2 UNITS: at 13:12

## 2022-05-05 RX ADMIN — Medication 10 UNITS: at 00:17

## 2022-05-05 NOTE — ED NOTES
TRANSFER - OUT REPORT:    Verbal report given to Lucas Shone RN(name) on Elsy Crowell  being transferred to (unit) for routine progression of care       Report consisted of patients Situation, Background, Assessment and   Recommendations(SBAR). Information from the following report(s) SBAR, ED Summary, [facility] and Recent Results was reviewed with the receiving nurse. Lines:   Peripheral IV 05/04/22 Left Arm (Active)   Site Assessment Clean, dry, & intact 05/04/22 1340   Phlebitis Assessment 0 05/04/22 1340   Infiltration Assessment 0 05/04/22 1340   Dressing Status Clean, dry, & intact 05/04/22 1340   Dressing Type Transparent 05/04/22 1340   Hub Color/Line Status Blue 05/04/22 1340       Peripheral IV 05/04/22 Right Forearm (Active)   Site Assessment Clean, dry, & intact 05/04/22 2040   Phlebitis Assessment 0 05/04/22 2040   Infiltration Assessment 0 05/04/22 2040   Dressing Status Clean, dry, & intact 05/04/22 2040   Dressing Type Transparent 05/04/22 2040   Hub Color/Line Status Blue;Flushed;Patent 05/04/22 2040        Opportunity for questions and clarification was provided.       Patient transported with:   Registered Nurse

## 2022-05-05 NOTE — PROGRESS NOTES
0145: Patient arrived to [facility] from ED. Primary Nurse Vincenzo Dewitt, [facility] and Thuy Kim RN performed a dual skin assessment on this patient No impairment noted  Tato score is 23      0515: [address] [place] notifies RN of 9-beat run [facility] on telemetry for patient. Patient returnes to sinus bradycardia. Patient resting in room, no s/s of distress.

## 2022-05-05 NOTE — DIABETES MGMT
[address]    CLINICAL NURSE SPECIALIST CONSULT     Initial Presentation   Newell Olszewski is a 46 y.o. male who presented to the ED via urgent care with increased thirst, urination, fatigue, and elevated BG. Initial labs significant for a , K 5.7, GFR 25, Alk vangie 430. A1C 11.4%. UA with 1000+ glucosuria, small ketones. He was given calcium gluconate/IV insulin/Dextrose in the ER and started on a bicarb gtt. And admitted for medical mgt. HX:   Past Medical History:   Diagnosis Date    Abnormal EKG     Abnormal nuclear cardiac imaging test 11/05/2001    No evidence of ischemia. Minimal anterior scar or artifact. Pos EKG on max EST. Ex time 12:29.      Gout     History of echocardiogram 05/23/2012    Tech difficult. EF 60-65%. No WMA. Mod LVH. Mild LAE. No significant valvular heart disease. 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 Patent coronary arteries. LVEDP 25.  EF 65%. RA 7.  RV 41/7. PA 30/14. W 12.  CO 5.6.  Tilt table evaluation 11/02/2001    Negative study in resting state.  Vitamin D deficiency         INITIAL DX:   Hyperkalemia [E87.5] Hyperglycemia     Current Treatment     TX: IV insulin/Dextrose, Kayexalate, Nephrology consultation     Consulted by Jacques Small MD  for advanced diabetes nursing assessment and care for:   [x] Home management assessment    Hospital Course   Clinical progress has been uncomplicated. Diabetes History   Type 2 Diabetes: Diagnosed in 2016. Ambulatory BG management provided by: Vineet Beebe MD      Diabetes-related Medical History  Microvascular disease  Nephropathy  Macrovascular disease  CAD  Other associated conditions     Cardiomyopathy    Diabetes Medication History  Key Antihyperglycemic Medications     Patient is on no antihyperglycemic meds.            Diabetes self-management practices:   Eating patten  [] Breakfast Eggs/Turkey sausage  [] Lunch  [place]ian  [place] ([facility]) sandwich, Apple  [] Dinner  Qinti, Valentin [employer], Chicken in the air marisol fox  [] Bedtime None  [] Snacks Limited  [] Beverages Water  Physical activity pattern  Used to be very active with work, over 18K steps daily  Last 6 months \"I have done absolutely nothing\"  Monitoring pattern  Doesn't check BG    Social determinants of health impacting diabetes self-management practices   Concerned that you need to know more about how to stay healthy with diabetes    Overall evaluation:    [x] Not achieving A1c target with drug therapy & self-care practices    Subjective   My A1C was 6 in the fall. 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 Diagnosed with diabetes in 2016 after an ED visit for symptomatic hyperglycemia  He was temporarily on insulin (1-2 mos), but lost weight and made lifestyle changes and was ultimately taken off of the insulin. He reports that his last physical revealed a A1c of approximately 6 (past fall) and he has not been on any medication for diabetes. Endorses weight gain (20lbs) over the last several months which he attributes to [employer] and limited activity\"  Objective   Physical exam  General Obese AA male in no acute distress/ill-appearing. Conversant and cooperative. Daughter at bedside  Neuro  Alert, oriented   Vital Signs   Visit Vitals  /80 (BP 1 Location: Right upper arm, BP Patient Position: At rest)   Pulse (!) 53   Temp 98.5 °F (36.9 °C)   Resp 16   Ht 6' (1.829 m)   Wt 114.5 kg (252 lb 6.8 oz)   SpO2 98%   BMI 34.24 kg/m²     Skin  Warm and dry. Acanthosis noted along neckline. Heart   Regular rate and rhythm.  No murmurs, rubs or gallops  Lungs  Clear to auscultation without rales or rhonchi  Extremities No foot wounds      Laboratory  Recent Labs     22  0414 220 22  1510 22  1337 22  1337   * 254* 397*   < > 461*   AGAP 9 10 9   < > 14   WBC  --   --   --   --  8.4   CREA 1.47* 1.49* 1.85*   < > 1.99*   GFRNA 50* 49* 38*   < > 35*   AST 19 21 54*   < > 28   ALT 32 36 42   < > 44    < > = values in this interval not displayed. Blood glucose pattern      Significant diabetes-related events over the past 24-72 hours  A1C 11.4%  UCR 27  Fasting B  Pre-prandial B-388  Basal: 15 units glargine  Bolus: None  Correction: 22 units in the last 24h      Assessment and Plan   Nursing Diagnosis Risk for unstable blood glucose pattern   Nursing Intervention Domain 5250 Decision-making Support   Nursing Interventions Examined current inpatient diabetes/blood glucose control   Explored factors facilitating and impeding inpatient management  Explored corrective strategies with patient and responsible inpatient provider   Informed patient of rational for insulin strategy while hospitalized     Nursing Diagnosis [id number] Ineffective Health Management   Nursing Intervention Domain 5250 Decision-makingSupport   Nursing Interventions Identified diabetes self-management practices impeding diabetes control  Discussed diabetes survival skills related to  1. Healthy Plate eating plan; given handouts  2. Role of physical activity in improving insulin sensitivity and action  3. Procedure for blood glucose monitoring & options for low-cost products available from [facility] ([place], [place])   4. Medications plan at discharge     Evaluation   Vinny Finney, is a 46year old gentleman, with a history of Type 2 Diabetes previously managed with diet and activity, admitted with hyperglycemia and elevated A1C. BG on admission was 430 and A1C%. Patient endorses lifestyle changes over the last 6 months including high carbohydrate intake, weight gain and limited activity.   At this time, patient will need to resume antihyperglycemic therapy and lifestyle changes. Very low dose glargine and correctional humalog has started. Please titrate doses to weight based basal/bolus therapy and target an inpatient BG goal of 140-180mg/dl. Recommendations   1. POC glucose ACHS    2. Consistent carbohydrate diet (60 grams CHO/meal)    3. Subcutaneous Insulin Order set (6782)  Insulin Dosing Specific recommendation   Basal                                      (Based on weight, BMI & GFR) Please give another 15 units   Lantus x1 now then increase   daily dose to     [x] 0.3 units/kg/D (moderate dose): 30 units daily    Nutritional                                      (Based on CHO/dextrose load) [x] Normal sensitivity: Start 6 units Humalog/meal (low dose)    Hold if patient is NPO or consumes less than 50% of carbohydrates on meal tray   Advance by 2 units daily for persistent pre-prandial hyperglycemia     Corrective                                       (Useful in adjusting insulin dosing) [x] Normal sensitivity: ACHS        Please encourage patient to participate in diabetes self care while in the hospital including allowing patient to use lancet for blood glucose monitoring and self-administer insulin injections. Discharge Recommendations   1. Will need a FUV with PCP within 1-2 weeks after hospital discharge for ongoing diabetes management     2. On Discharge, please place an outpatient order for \"diabetes education\" (enter as REF20). This will trigger a referral for the [facility] which includes outpatient diabetes self management training with a certified diabetes educator. 3. Metformin  mg: Start with 500 mg Daily; monitor for GI side effects. Side effects should resolve after 5-7 days. If no GI side effects- advance dose by 1 tablet every 5-7 days to a total dose of 1000 mg BID. 4. Glargine PEN: Dose TBD    5.  Humalog PEN: Dose TBD    Billing Code(s)   [x] [id number]   Before making these care recommendations, I personally reviewed the hospitalization record, including notes, laboratory & diagnostic data and current medications, and examined the patient at the bedside (circumstances permitting) before making care recommendations. More than fifty (50) percent of the time was spent in patient counseling and/or care coordination.   Total minutes: 54      GRACE Lobo  Diabetes Clinical Nurse Specialist  [facility]  Access via [employer]

## 2022-05-05 NOTE — PROGRESS NOTES
Transition of Care Plan   RUR: 6%   Disposition: The disposition plan is home with family assistance   F/U with PCP/Specialist     Transport: daughter     Reason for Admission:  hyperkalemia                     RUR Score:    6%                Plan for utilizing home health:    Not recommended       PCP: First and Last name:  Suraj Vincent MD     Name of Practice:    Are you a current patient: Yes/No:    Approximate date of last visit:    Can you participate in a virtual visit with your PCP:                     Current Advanced Directive/Advance Care Plan: Full Code      Healthcare Decision Maker:                     Transition of Care Plan:                      CRM spoke with patient, introduced self, explained role, verified demographics, and offered assistance. The patient lives alone in an apartment with 2 flights to enter. The patient is independent in is ADL's/IADL's and does not have any DME. Patient uses [address] [place] [address] [facility] for his prescriptions. Care Management Interventions  PCP Verified by CM: Yes  Palliative Care Criteria Met (RRAT>21 & CHF Dx)?: No  Mode of Transport at Discharge:  Other (see comment) (daughter )  Transition of Care Consult (CM Consult): Discharge Planning  MyChart Signup: No  Discharge Durable Medical Equipment: No  Health Maintenance Reviewed: Yes  Physical Therapy Consult: No  Occupational Therapy Consult: No  Speech Therapy Consult: No  Support Systems: Child(jose)  Confirm Follow Up Transport: Family  [employer] Information Provided?: No  Discharge Location  Patient Expects to be Discharged to[de-identified] Home with family assistance    6:31 PM  JOBY Corona

## 2022-05-05 NOTE — CONSULTS
Assessment:  SHAYLEE on CKD stage 2: Serum Cr 1.9mg/dl-> 1.8-> 1.4mg/dl. Suspect Pre-renal state + ARB therapy. Hyperkalemia: improving    Metabolic acidosis: improving with IV bicarb    DM2: previously diet controlled-> khan spike in HgbA1c to 11.4. Largely due to diet noncompliance/+weight gain    HTN: stable    Gout    Plan/Recommendations:  Ct IV 1/2NS + 100meq Sodium Bicarbonate at 84cc/hr  Low K diet  DM education  Check Uric acid. May benefit from ULT  Educated patient on curbing/avoiding NSAID use  Hold ARB  AM labs      Discussed with patient/daughter    Thanks for the consultation. Renal service will follow patient with you. Please contact me with any questions or concerns. Initial Consult note         Patient name: Little Dykes  MR no: [id number]  Date of admission: 5/4/2022  Date of consultation: 5/5/2022  Requested by: Dr. Antionette Black  Reason for consult: SHAYLEE/CKD    Patient seen and examined. History obtained from patient and chart review. Relevant labs, data and notes reviewed. HPI: Little Dyeks is a 46 y.o. male with PMH significant for CKD stage 2/3a, HTN, DM2, Gout presented with hyperglycemia >400. +Polydipsia/Polyuria for several months. Reports having diet controlled DM. States he gained nearly 25lbs over the last 3-4months. No n/v/d. +Constipation. ED lab work significant for elevated bld glucose 461, K 5.7 (repeat 7.6), Cr 1.9mg/dl. Nephrology consulted to evaluate and manage SHAYLEE/CKD. Admits to intermittent NSAID use for gout. PMH:  Past Medical History:   Diagnosis Date    Abnormal EKG     Abnormal nuclear cardiac imaging test 11/05/2001    No evidence of ischemia. Minimal anterior scar or artifact. Pos EKG on max EST. Ex time 12:29.      Gout     History of echocardiogram 05/23/2012    Tech difficult. EF 60-65%. No WMA. Mod LVH. Mild LAE. No significant valvular heart disease.    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 Patent coronary arteries. LVEDP 25.  EF 65%. RA 7.  RV 41/7. PA 30/14. W 12.  CO 5.6.  Tilt table evaluation 11/02/2001    Negative study in resting state.  Vitamin D deficiency      PSH:  Past Surgical History:   Procedure Laterality Date    HX CHOLECYSTECTOMY  April 2001    HX HEART CATHETERIZATION  10/31/2011    HX OTHER SURGICAL  1983    Right knee surgery    VT REMOVAL GALLBLADDER  4/2000       Social history:   Social History     Tobacco Use    Smoking status: Never Smoker    Smokeless tobacco: Never Used   Substance Use Topics    Alcohol use: Yes     Comment: Socially    Drug use: No       Family history:  No history of CKD or ESRD in the family. No Known Allergies    Current Facility-Administered Medications   Medication Dose Route Frequency Last Admin    insulin glargine (LANTUS) injection 15 Units  15 Units SubCUTAneous DAILY 15 Units at 05/05/22 0914    insulin lispro (HUMALOG) injection   SubCUTAneous AC&HS      insulin lispro (HUMALOG) injection 5 Units  5 Units SubCUTAneous TIDAC 5 Units at 05/05/22 0913    dextrose 10 % infusion 125-250 mL  125-250 mL IntraVENous PRN      sodium chloride (NS) flush 5-40 mL  5-40 mL IntraVENous Q8H 10 mL at 05/05/22 0700    sodium chloride (NS) flush 5-40 mL  5-40 mL IntraVENous PRN      glucose chewable tablet 16 g  4 Tablet Oral PRN      dextrose 10 % infusion 0-250 mL  0-250 mL IntraVENous PRN      glucagon (GLUCAGEN) injection 1 mg  1 mg IntraMUSCular PRN      sodium bicarbonate (8.4%) 100 mEq in 0.45% sodium chloride 1,000 mL infusion   IntraVENous CONTINUOUS New Bag at 05/05/22 0847       ROS (besides HPI):    General: No fever. + weight gain  ENT: No hearing loss or visual changes  Cardiovascular: No Chest pain/SOB  Pulmonary: No SOB  GI: No abdominal pain. No Nausea/Vomiting/Diarrhea. +Constipation.  No blood in stool  : No blood in urine. No foamy or cloudy urine  Musculoskeletal: No joint swelling or redness. No morning stiffness  Endocrine: no cold or heat intolerance  Psych: denies anxiety or depression  Neuro: No light headedness or dizziness    Objective   Visit Vitals  /80 (BP 1 Location: Right upper arm, BP Patient Position: At rest)   Pulse 78   Temp 98.5 °F (36.9 °C)   Resp 16   Ht 6' (1.829 m)   Wt 114.5 kg (252 lb 6.8 oz)   SpO2 98%   BMI 34.24 kg/m²       Physical Exam:    Gen: NAD/obese    HEENT: AT/NC, EOMI, moist mucous membrane, no scleral icterus    Neck: no JVD, no cervical lymphadenopathy, no carotid bruit    Lungs/Chest wall: Breath sounds normal. Symmetrical chest wall expansion. No accessory muscle use. Clear to auscultation    Cardiovascular: Normal S1/S2, normal rate, regular rhythm. Abdomen: soft, NT, ND, BS+, no HSM    Ext:  No edema    Skin: warm and dry. No rashes    : no pompa    CNS: alert awake. Answers appropriately.      Labs/Data:    Lab Results   Component Value Date/Time    Sodium 134 (L) 05/05/2022 04:14 AM    Potassium 4.3 05/05/2022 04:14 AM    Chloride 104 05/05/2022 04:14 AM    CO2 21 05/05/2022 04:14 AM    Anion gap 9 05/05/2022 04:14 AM    Glucose 333 (H) 05/05/2022 04:14 AM    BUN 29 (H) 05/05/2022 04:14 AM    Creatinine 1.47 (H) 05/05/2022 04:14 AM    BUN/Creatinine ratio 20 05/05/2022 04:14 AM    GFR est AA >60 05/05/2022 04:14 AM    GFR est non-AA 50 (L) 05/05/2022 04:14 AM    Calcium 8.7 05/05/2022 04:14 AM       Lab Results   Component Value Date/Time    WBC 7.6 05/05/2022 04:14 AM    HGB 12.8 05/05/2022 04:14 AM    HCT 37.9 05/05/2022 04:14 AM    PLATELET 494 69/90/1580 04:14 AM    MCV 82.2 05/05/2022 04:14 AM       Urine analysis:   Results for orders placed or performed in visit on 09/30/16   AMB POC URINALYSIS DIP STICK AUTO W/O MICRO     Status: None   Result Value Ref Range Status    Color (UA POC) Yellow  Final     Comment: >1000mg/dl    Clarity (UA POC) Clear  Final    Glucose (UA POC) 4+ Negative Final    Bilirubin (UA POC) Negative Negative Final    Ketones (UA POC) Trace Negative Final    Specific gravity (UA POC) 1.020 1.001 - 1.035 Final    Blood (UA POC) 3+ Negative Final    pH (UA POC) 5.5 4.6 - 8.0 Final    Protein (UA POC) 1+ Negative mg/dL Final    Urobilinogen (UA POC) 0.2 mg/dL 0.2 - 1 Final    Nitrites (UA POC) Negative Negative Final    Leukocyte esterase (UA POC) Negative Negative Final           No components found for: SPEP, UPEP  No results found for: PUQ, PROTU2, PROTU1, BJP1, CPE1, IMEL1, MET2  No results found for: MCACR, MCA1, MCA2, MCA3, MCAU, MCAU2, MCALPOCT      Intake/Output Summary (Last 24 hours) at 5/5/2022 1157  Last data filed at 5/5/2022 0203  Gross per 24 hour   Intake 2519 ml   Output --   Net 2519 ml       Wt Readings from Last 3 Encounters:   05/05/22 114.5 kg (252 lb 6.8 oz)   11/18/21 116.1 kg (256 lb)   10/28/21 116.1 kg (256 lb)       Signed by:  Juana Torres MD  Nephrology and Hypertension  [facility]

## 2022-05-05 NOTE — ED NOTES
Verbal shift change report given to [address] (oncoming nurse) by Zak Rutherford RN (offgoing nurse). Report included the following information SBAR, ED Summary, MAR and Recent Results.

## 2022-05-06 VITALS
SYSTOLIC BLOOD PRESSURE: 144 MMHG | HEART RATE: 60 BPM | RESPIRATION RATE: 20 BRPM | DIASTOLIC BLOOD PRESSURE: 87 MMHG | TEMPERATURE: 98 F | WEIGHT: 253.53 LBS | BODY MASS INDEX: 34.34 KG/M2 | OXYGEN SATURATION: 98 % | HEIGHT: 72 IN

## 2022-05-06 LAB
ANION GAP SERPL CALC-SCNC: 9 MMOL/L (ref 5–15)
BUN SERPL-MCNC: 20 MG/DL (ref 6–20)
BUN/CREAT SERPL: 14 (ref 12–20)
CALCIUM SERPL-MCNC: 9 MG/DL (ref 8.5–10.1)
CHLORIDE SERPL-SCNC: 103 MMOL/L (ref 97–108)
CO2 SERPL-SCNC: 24 MMOL/L (ref 21–32)
COMMENT, HOLDF: NORMAL
CREAT SERPL-MCNC: 1.43 MG/DL (ref 0.7–1.3)
GLUCOSE BLD STRIP.AUTO-MCNC: 220 MG/DL (ref 65–117)
GLUCOSE BLD STRIP.AUTO-MCNC: 221 MG/DL (ref 65–117)
GLUCOSE SERPL-MCNC: 210 MG/DL (ref 65–100)
MAGNESIUM SERPL-MCNC: 1.8 MG/DL (ref 1.6–2.4)
PHOSPHATE SERPL-MCNC: 3.8 MG/DL (ref 2.6–4.7)
POTASSIUM SERPL-SCNC: 3.6 MMOL/L (ref 3.5–5.1)
SAMPLES BEING HELD,HOLD: NORMAL
SERVICE CMNT-IMP: ABNORMAL
SERVICE CMNT-IMP: ABNORMAL
SODIUM SERPL-SCNC: 136 MMOL/L (ref 136–145)
URATE SERPL-MCNC: 8.6 MG/DL (ref 3.5–7.2)

## 2022-05-06 PROCEDURE — 36415 COLL VENOUS BLD VENIPUNCTURE: CPT

## 2022-05-06 PROCEDURE — 84100 ASSAY OF PHOSPHORUS: CPT

## 2022-05-06 PROCEDURE — 74011250637 HC RX REV CODE- 250/637: Performed by: FAMILY MEDICINE

## 2022-05-06 PROCEDURE — 82962 GLUCOSE BLOOD TEST: CPT

## 2022-05-06 PROCEDURE — 99233 SBSQ HOSP IP/OBS HIGH 50: CPT | Performed by: CLINICAL NURSE SPECIALIST

## 2022-05-06 PROCEDURE — 80048 BASIC METABOLIC PNL TOTAL CA: CPT

## 2022-05-06 PROCEDURE — 74011636637 HC RX REV CODE- 636/637: Performed by: FAMILY MEDICINE

## 2022-05-06 PROCEDURE — 84550 ASSAY OF BLOOD/URIC ACID: CPT

## 2022-05-06 PROCEDURE — 83735 ASSAY OF MAGNESIUM: CPT

## 2022-05-06 RX ORDER — ALLOPURINOL 100 MG/1
100 TABLET ORAL DAILY
Qty: 30 TABLET | Refills: 4 | Status: SHIPPED | OUTPATIENT
Start: 2022-05-07

## 2022-05-06 RX ORDER — PEN NEEDLE, DIABETIC 30 GX3/16"
1 NEEDLE, DISPOSABLE MISCELLANEOUS 4 TIMES DAILY
Qty: 120 EACH | Refills: 11 | Status: SHIPPED | OUTPATIENT
Start: 2022-05-06 | End: 2022-06-06

## 2022-05-06 RX ORDER — INSULIN GLARGINE 100 [IU]/ML
25 INJECTION, SOLUTION SUBCUTANEOUS
Qty: 7.5 ML | Refills: 4 | Status: SHIPPED | OUTPATIENT
Start: 2022-05-06 | End: 2022-06-06

## 2022-05-06 RX ORDER — ALLOPURINOL 100 MG/1
100 TABLET ORAL DAILY
Status: DISCONTINUED | OUTPATIENT
Start: 2022-05-06 | End: 2022-05-06 | Stop reason: HOSPADM

## 2022-05-06 RX ORDER — IBUPROFEN 200 MG
1 CAPSULE ORAL 4 TIMES DAILY
Qty: 120 STRIP | Refills: 5 | Status: SHIPPED | OUTPATIENT
Start: 2022-05-06

## 2022-05-06 RX ORDER — MAGNESIUM SULFATE 100 %
4 CRYSTALS MISCELLANEOUS AS NEEDED
Qty: 30 TABLET | Refills: 5 | Status: SHIPPED | OUTPATIENT
Start: 2022-05-06

## 2022-05-06 RX ORDER — INSULIN LISPRO 100 [IU]/ML
8 INJECTION, SOLUTION INTRAVENOUS; SUBCUTANEOUS
Qty: 7.2 ML | Refills: 4 | Status: SHIPPED | OUTPATIENT
Start: 2022-05-06 | End: 2022-06-06

## 2022-05-06 RX ORDER — AMLODIPINE BESYLATE 5 MG/1
5 TABLET ORAL DAILY
Qty: 30 TABLET | Refills: 5 | Status: SHIPPED | OUTPATIENT
Start: 2022-05-06

## 2022-05-06 RX ORDER — INSULIN PUMP SYRINGE, 3 ML
1 EACH MISCELLANEOUS 4 TIMES DAILY
Qty: 1 KIT | Refills: 0 | Status: SHIPPED | OUTPATIENT
Start: 2022-05-06

## 2022-05-06 RX ORDER — LANCETS
1 EACH MISCELLANEOUS 4 TIMES DAILY
Qty: 200 EACH | Refills: 11 | Status: SHIPPED | OUTPATIENT
Start: 2022-05-06 | End: 2022-06-05

## 2022-05-06 RX ADMIN — Medication 5 UNITS: at 12:45

## 2022-05-06 RX ADMIN — Medication 4 UNITS: at 12:46

## 2022-05-06 RX ADMIN — Medication 5 UNITS: at 07:22

## 2022-05-06 RX ADMIN — ALLOPURINOL 100 MG: 100 TABLET ORAL at 09:21

## 2022-05-06 RX ADMIN — Medication 4 UNITS: at 07:22

## 2022-05-06 NOTE — DISCHARGE SUMMARY
Discharge Summary       PATIENT ID: Walker Castillo  MRN: [id number]   YOB: 1970    DATE OF ADMISSION: 5/4/2022 12:24 PM    DATE OF DISCHARGE: 5/6/22 1:40pm   PRIMARY CARE PROVIDER: Leonardo Tolliver MD     ATTENDING PHYSICIAN: Ana Maya  DISCHARGING PROVIDER: Rowdy Davila MD    To contact this individual call [phone] and ask the  to page. If unavailable ask to be transferred the Adult Hospitalist Department. CONSULTATIONS: IP CONSULT TO NEPHROLOGY    PROCEDURES/SURGERIES: * No surgery found *    ADMISSION SUMMARY AND HOSPITAL COURSE:   [facility] [place] [address] is a 46 y. o. male who presents with abnormal labs. Patient reports that he started having some weakness, he gained some weight, has polydipsia and polyuria, started feeling poorly, got concerned and [facility], was found to have elevated blood glucose and was told to come to the ER. . Ezio Billy reports he was diagnosed with diabetes but was able to control it with lifestyle modification, reports that in last November his hemoglobin A1c was 6 and his fasting glucose was within normal limits, he was taken off all medications, reports that in the last 3 months, he has been noncompliant with diet and exercise modification, reports that he retired and has been eating quite a bit, reports that he has gained 20 pounds,.  Came to the ER today, was found to be hyperglycemic and in acute renal failure and was requested to be admitted to the hospitalist service    [id number] [place], [place] [address]y. [address] E / PLAN:       Acute kidney injury on CKD3- creatinine elevated due to dehydration from uncontrolled DM- management per nephrology, CT without obstruction-  Treated with bicarb drip, creatinine back to baseline- 1.4  Gout- mildly elevated uric acid levels- allopurinol daily  Hyperkalemia- corrected  Diabetes mellitus type 2 with poor control and hyperglycemia  Adjusting insulin, diabetic diet  Normal anion gap metabolic acidosis- resolved with treatments  Accelerated hypertension - stopped losartan, adjusting antihypertensives- home on amlodipine    Elevated alk phos and CT showing sclerosis of sacrum- both likely due to undiagnosed Paget's Disease - discussed with patient and provided copies of scans and labs    PENDING TEST RESULTS:   At the time of discharge the following test results are still pending:none     ADDITIONAL CARE RECOMMENDATIONS:   You were admitted with the following medical issues:  High blood glucose levels- due to uncontrolled diabetes  - strict diabetic diet, exercise as tolerated  - monitor premeal blood glucose levels and record for insulin dose adjustments and follow up  - take insulin as directed- long acting lantus once daily at bedtime and fast acting with meals    - 2- acute kidney injury on baseline chronic kidney disease stage 3  - treated with IV fluids with bicarb  Baseline creatinine = 1.4  No indomethcin or NSAIDS  Avoid dehydration    3- gout- avoid alcohol  Take allopurinol daily to reduce uric acid levels       NOTIFY YOUR PHYSICIAN FOR ANY OF THE FOLLOWING:   Fever over 101 degrees for 24 hours. Chest pain, shortness of breath, fever, chills, nausea, vomiting, diarrhea, change in mentation, falling, weakness, bleeding. Severe pain or pain not relieved by medications, as well as any other signs or symptoms that you may have questions about.     FOLLOW UP APPOINTMENTS:    Follow-up Information     Follow up With Specialties Details Why Contact Info    Program for [facility] Diabetes Schedule an appointment as soon as possible for a visit Call to schedule an apt with the certified diabetes nurse/dietician [address] [place] [address], [address] [place] [address] [address] [facility] Mary Smith MD Nephrology Schedule an appointment as soon as possible for a visit in 2 weeks for hospital follow up regarding kidney disease [address] [facility] [address] [address]      Yonis Fairchild MD Internal Medicine Physician   [address] [place] [address]            DIET: Diabetic Diet  ACTIVITY: Activity as tolerated and no driving for today  EQUIPMENT needed: glucometer    DISCHARGE MEDICATIONS:  Current Discharge Medication List      START taking these medications    Details   allopurinoL (ZYLOPRIM) 100 mg tablet Take 1 Tablet by mouth daily. Indications: treatment to prevent acute gout attack  Qty: 30 Tablet, Refills: 4  Start date: 5/7/2022      glucose 4 gram chewable tablet Take 4 Tablets by mouth as needed for PRN Reason (Other) (hypoglycemia). Qty: 30 Tablet, Refills: 5  Start date: 5/6/2022      insulin glargine (LANTUS,BASAGLAR) 100 unit/mL (3 mL) inpn 25 Units by SubCUTAneous route nightly for 31 days. Qty: 7.5 mL, Refills: 4  Start date: 5/6/2022, End date: 6/6/2022      insulin lispro (HUMALOG) 100 unit/mL kwikpen 8 Units by SubCUTAneous route Before breakfast, lunch, and dinner for 31 days. Indications: type 2 diabetes mellitus  Qty: 7.2 mL, Refills: 4  Start date: 5/6/2022, End date: 6/6/2022      amLODIPine (NORVASC) 5 mg tablet Take 1 Tablet by mouth daily. Indications: high blood pressure  Qty: 30 Tablet, Refills: 5  Start date: 5/6/2022      Blood-Glucose Meter monitoring kit 1 Kit by SubCUTAneous route four (4) times daily. 1 glucometer of patient's choice  Check glucose levels 4x daily  Dx: DM type 2  Qty: 1 Kit, Refills: 0  Start date: 5/6/2022      glucose blood VI test strips (blood glucose test) strip 1 Each by Does Not Apply route four (4) times daily. Test glucose levels 4x daily  Qty: 120 Strip, Refills: 5  Start date: 5/6/2022      Insulin Needles, Disposable, 31 gauge x 5/16\" ndle 1 Units by SubCUTAneous route four (4) times daily for 31 days. Qty: 120 Each, Refills: 11  Start date: 5/6/2022, End date: 6/6/2022      lancets misc 1 Package by Does Not Apply route four (4) times daily for 30 days.   Qty: 200 Each, Refills: 11  Start date: 5/6/2022, End date: 6/5/2022         STOP taking these medications       indomethacin SR (INDOCIN SR) 75 mg SR capsule Comments:   Reason for Stopping:         amLODIPine-Olmesartan 5-20 mg tab Comments:   Reason for Stopping:               DISPOSITION:    Home With:   OT  PT  HH  RN       Long term SNF/Inpatient Rehab   X Independent living    Hospice    Other:       PATIENT CONDITION AT DISCHARGE:     Functional status    Poor     Deconditioned    X Independent      Cognition   X  Lucid     Forgetful     Dementia      Catheters/lines (plus indication)    Doyle     PICC     PEG    X None      Code status   X  Full code     DNR      PHYSICAL EXAMINATION AT DISCHARGE:  Patient Vitals for the past 24 hrs:   Temp Pulse Resp BP SpO2   05/06/22 1209 98 °F (36.7 °C) 60 20 (!) 144/87 98 %   05/06/22 1200 -- 86 -- -- --   05/06/22 1000 -- 94 -- -- --   05/06/22 0921 98.3 °F (36.8 °C) 68 20 (!) 155/72 97 %   05/06/22 0352 98.6 °F (37 °C) (!) 51 20 134/80 97 %   05/05/22 2351 98.1 °F (36.7 °C) 72 19 129/68 96 %   05/05/22 2050 98.6 °F (37 °C) 60 20 (!) 146/85 96 %   05/05/22 1800 -- (!) 56 -- -- --   05/05/22 1600 98.2 °F (36.8 °C) 61 16 138/85 99 %   05/05/22 1400 -- 60 -- -- --     General:          Alert, cooperative, no distress, appears stated age. HEENT:           Atraumatic, anicteric sclerae, pink conjunctivae  Neck:               Supple, symmetrical  Lungs:             Clear to auscultation bilaterally. No Wheezing or Rhonchi. No rales. Chest wall:      No tenderness  No Accessory muscle use. Heart:              Regular  rhythm,  No  murmur   No edema  Abdomen:        Soft, non-tender. Not distended. Bowel sounds normal  Extremities:     No cyanosis. No clubbing,  trace edema  Skin:                Not pale. Not Jaundiced  No rashes   Psych:             Not anxious or agitated.   Neurologic:      Alert, moves all extremities, answers questions appropriately and responds to commands   Recent Results (from the past 24 hour(s))   GLUCOSE, POC    Collection Time: 05/05/22  5:03 PM   Result Value Ref Range    Glucose (POC) 296 (H) 65 - 117 mg/dL    Performed by May Byrd (CON)    GLUCOSE, POC    Collection Time: 05/05/22  9:28 PM   Result Value Ref Range    Glucose (POC) 317 (H) 65 - 117 mg/dL    Performed by KEYLA TINAJERO    URIC ACID    Collection Time: 05/06/22  4:02 AM   Result Value Ref Range    Uric acid 8.6 (H) 3.5 - 7.2 MG/DL   MAGNESIUM    Collection Time: 05/06/22  4:02 AM   Result Value Ref Range    Magnesium 1.8 1.6 - 2.4 mg/dL   PHOSPHORUS    Collection Time: 05/06/22  4:02 AM   Result Value Ref Range    Phosphorus 3.8 2.6 - 4.7 MG/DL   SAMPLES BEING HELD    Collection Time: 05/06/22  4:02 AM   Result Value Ref Range    SAMPLES BEING HELD 1LAV     COMMENT        Add-on orders for these samples will be processed based on acceptable specimen integrity and analyte stability, which may vary by analyte. METABOLIC PANEL, BASIC    Collection Time: 05/06/22  4:02 AM   Result Value Ref Range    Sodium 136 136 - 145 mmol/L    Potassium 3.6 3.5 - 5.1 mmol/L    Chloride 103 97 - 108 mmol/L    CO2 24 21 - 32 mmol/L    Anion gap 9 5 - 15 mmol/L    Glucose 210 (H) 65 - 100 mg/dL    BUN 20 6 - 20 MG/DL    Creatinine 1.43 (H) 0.70 - 1.30 MG/DL    BUN/Creatinine ratio 14 12 - 20      GFR est AA >60 >60 ml/min/1.73m2    GFR est non-AA 52 (L) >60 ml/min/1.73m2    Calcium 9.0 8.5 - 10.1 MG/DL   GLUCOSE, POC    Collection Time: 05/06/22  6:28 AM   Result Value Ref Range    Glucose (POC) 221 (H) 65 - 117 mg/dL    Performed by Judy Stein    GLUCOSE, POC    Collection Time: 05/06/22 12:00 PM   Result Value Ref Range    Glucose (POC) 220 (H) 65 - 117 mg/dL    Performed by Rowan [facility]      CT ABD PELV WO CONT     INDICATION: Hyperglycemia, weight gain.     The absence of intravenous contrast material reduces the sensitivity for  evaluation of the vasculature and solid organs.     FINDINGS:   LOWER THORAX: No significant abnormality in the incidentally imaged lower chest.  LIVER: No mass. BILIARY TREE: Cholecystectomy. CBD is not dilated. SPLEEN: within normal limits. PANCREAS: No inflammation. No significant atrophy. ADRENALS: Unremarkable. KIDNEYS/URETERS: No calculus or hydronephrosis. STOMACH: Unremarkable. SMALL BOWEL: No dilatation or wall thickening. COLON: No dilatation or wall thickening. APPENDIX: Normal.  PERITONEUM: No ascites or pneumoperitoneum. RETROPERITONEUM: No lymphadenopathy or aortic aneurysm. REPRODUCTIVE ORGANS: Normal sized prostate gland. URINARY BLADDER: No mass or calculus. BONES: Sacral Paget's disease. ABDOMINAL WALL: No mass or hernia. ADDITIONAL COMMENTS: N/A     IMPRESSION  No acute process on noncontrast CT. No nephrolithiasis or hydronephrosis.     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

## 2022-05-06 NOTE — PROGRESS NOTES
Patient name: Barry Odom  MRN: [id number]    Nephrology Progress note:    Assessment:  SHAYLEE on CKD stage 2: Serum Cr 1.9mg/dl-> 1.8-> 1.4mg/dl. Suspect Pre-renal state + ARB therapy.      Hyperkalemia: improving     Metabolic acidosis: improved with IV bicarb     DM2: previously diet controlled-> khan spike in HgbA1c to 11.4. Largely due to diet noncompliance/+weight gain     HTN: stable     Gout: +hyperuricemia-> started on Allopurinol    Plan/Recommendations:  Stable for discharge  Follow up at our office in 2-3wks. Will have our office contact him for an appt  Ct to hold RASi  Ct efforts on getting bld sugars under better control  Avoid nephrotoxins  Amlodipine        Subjective:  Feels well. No events overnight. ROS:   No nausea, no vomiting  No chest pain, no shortness of breath    Exam:  Visit Vitals  BP (!) 144/87 (BP 1 Location: Left upper arm, BP Patient Position: At rest)   Pulse 60   Temp 98 °F (36.7 °C)   Resp 20   Ht 6' (1.829 m)   Wt 115 kg (253 lb 8.5 oz)   SpO2 98%   BMI 34.38 kg/m²     Wt Readings from Last 3 Encounters:   05/06/22 115 kg (253 lb 8.5 oz)   11/18/21 116.1 kg (256 lb)   10/28/21 116.1 kg (256 lb)       Intake/Output Summary (Last 24 hours) at 5/6/2022 1540  Last data filed at 5/5/2022 2050  Gross per 24 hour   Intake --   Output 600 ml   Net -600 ml       Gen: NAD/obese  HEENT: AT/NC  Lungs/Chest wall: Clear. No accessory muscle use. Cardiovascular: Regular rate, normal rhythm. Abdomen/: Soft, NT, ND, BS+.    Ext: No peripheral edema  CNS: alert and awake. Answers appropriately      Current Facility-Administered Medications   Medication Dose Route Frequency Last Admin    allopurinoL (ZYLOPRIM) tablet 100 mg  100 mg Oral DAILY 100 mg at 05/06/22 0921    insulin lispro (HUMALOG) injection   SubCUTAneous AC&HS 4 Units at 05/06/22 1246    insulin lispro (HUMALOG) injection 5 Units  5 Units SubCUTAneous TIDAC 5 Units at 05/06/22 1245    insulin glargine (LANTUS) injection 20 Units  20 Units SubCUTAneous QHS 20 Units at 05/05/22 2344    dextrose 10 % infusion 125-250 mL  125-250 mL IntraVENous PRN      sodium chloride (NS) flush 5-40 mL  5-40 mL IntraVENous Q8H 10 mL at 05/05/22 1313    sodium chloride (NS) flush 5-40 mL  5-40 mL IntraVENous PRN      glucose chewable tablet 16 g  4 Tablet Oral PRN      dextrose 10 % infusion 0-250 mL  0-250 mL IntraVENous PRN      glucagon (GLUCAGEN) injection 1 mg  1 mg IntraMUSCular PRN         Labs/Data:    Lab Results   Component Value Date/Time    WBC 7.6 05/05/2022 04:14 AM    HGB 12.8 05/05/2022 04:14 AM    HCT 37.9 05/05/2022 04:14 AM    PLATELET 764 53/97/6488 04:14 AM    MCV 82.2 05/05/2022 04:14 AM       Lab Results   Component Value Date/Time    Sodium 136 05/06/2022 04:02 AM    Potassium 3.6 05/06/2022 04:02 AM    Chloride 103 05/06/2022 04:02 AM    CO2 24 05/06/2022 04:02 AM    Anion gap 9 05/06/2022 04:02 AM    Glucose 210 (H) 05/06/2022 04:02 AM    BUN 20 05/06/2022 04:02 AM    Creatinine 1.43 (H) 05/06/2022 04:02 AM    BUN/Creatinine ratio 14 05/06/2022 04:02 AM    GFR est AA >60 05/06/2022 04:02 AM    GFR est non-AA 52 (L) 05/06/2022 04:02 AM    Calcium 9.0 05/06/2022 04:02 AM       Patient seen and examined. Chart reviewed. Labs, data and other pertinent notes reviewed in last 24 hrs.     Discussed with patient/daughter    Signed by:  Annabel Cruz MD  [address] [facility]

## 2022-05-06 NOTE — DISCHARGE INSTRUCTIONS
You were admitted with the following medical issues:  High blood glucose levels- due to uncontrolled diabetes  - strict diabetic diet, exercise as tolerated  - monitor premeal blood glucose levels and record for insulin dose adjustments and follow up  - take insulin as directed- long acting lantus once daily at bedtime and fast acting with meals    - 2- acute kidney injury on baseline chronic kidney disease stage 3  - treated with IV fluids with bicarb  Baseline creatinine = 1.4  No indomethcin or NSAIDS  Avoid dehydration    3- gout- avoid alcohol  Take allopurinol daily to reduce uric acid levels

## 2022-05-06 NOTE — PROGRESS NOTES
0800 Bedside shift change report given to   Basilia Jason(oncoming nurse) by Ayla Helton (offgoing nurse).  Report included the following information SBAR, Kardex, ED Summary,  Procedure Summary, Intake/Output, MAR, , Recent Results, Med Rec Status, and Cardiac Rhythm  SB first degree AV block

## 2022-05-06 NOTE — PROGRESS NOTES
[facility]  Hospitalist Group                                                                                          Hospitalist Progress Note  Imer Vital MD  Answering service: [phone] OR 36 from in house phone        Date of Service:  2022  NAME:  Candance Muckle  :  1970  MRN:  [id number]      Admission Summary:   Candance Muckle is a 46 y.o. male who presents with abnormal labs. Patient reports that he started having some weakness, he gained some weight, has polydipsia and polyuria, started feeling poorly, got concerned and went to [facility] today, was found to have elevated blood glucose and was told to come to the ER. Kailey Ellsworth Patient reports he was diagnosed with diabetes but was able to control it with lifestyle modification, reports that in last November his hemoglobin A1c was 6 and his fasting glucose was within normal limits, he was taken off all medications, reports that in the last 3 months, he has been noncompliant with diet and exercise modification, reports that he retired and has been eating quite a bit, reports that he has gained 20 pounds,.   Came to the ER today, was found to be hyperglycemic and in acute renal failure and was requested to be admitted to the hospitalist service       Interval history / Subjective:   Patient feeling better without complaints  Reviewed his vitals, labs, and care plan today     Assessment & Plan:      Acute kidney injury- due to dehydration from uncontrolled DM  - management per nephrology, cont bicarb drip, creatinine improving  Gout- recheck uric acid levels in am  Hyperkalemia- corrected  Diabetes mellitus type 2 with poor control and hypoglycemia  Adjusting insulin, diabetic diet  Normal anion gap metabolic acidosis- resolved with treatments  Accelerated hypertension - stopped losartan, adjusting antihypertensives            Hospital Problems  Date Reviewed: 10/28/2021          Codes Class Noted POA    Hyperkalemia ICD-10-CM: E87.5  ICD-9-CM: 276.7  5/4/2022 Unknown                Review of Systems:   A comprehensive review of systems was negative except for that written in the HPI. Vital Signs:    Last 24hrs VS reviewed since prior progress note. Most recent are:  Visit Vitals  BP (!) 146/85 (BP 1 Location: Left upper arm, BP Patient Position: At rest)   Pulse 60   Temp 98.6 °F (37 °C)   Resp 20   Ht 6' (1.829 m)   Wt 114.5 kg (252 lb 6.8 oz)   SpO2 96%   BMI 34.24 kg/m²     Patient Vitals for the past 24 hrs:   Temp Pulse Resp BP SpO2   05/05/22 2050 98.6 °F (37 °C) 60 20 (!) 146/85 96 %   05/05/22 1800 -- (!) 56 -- -- --   05/05/22 1600 98.2 °F (36.8 °C) 61 16 138/85 99 %   05/05/22 1400 -- 60 -- -- --   05/05/22 1200 97.8 °F (36.6 °C) (!) 53 16 113/68 97 %   05/05/22 1000 -- 78 -- -- --   05/05/22 0801 98.5 °F (36.9 °C) (!) 53 16 129/80 98 %   05/05/22 0411 97.8 °F (36.6 °C) (!) 53 16 128/76 95 %   05/05/22 0141 97.6 °F (36.4 °C) (!) 59 18 (!) 140/86 99 %   05/05/22 0115 -- (!) 59 16 122/73 97 %   05/05/22 0100 -- (!) 53 14 114/80 97 %   05/05/22 0058 -- 60 12 -- 97 %       Intake/Output Summary (Last 24 hours) at 5/5/2022 2250  Last data filed at 5/5/2022 2050  Gross per 24 hour   Intake 469 ml   Output 600 ml   Net -131 ml        Physical Examination:     I had a face to face encounter with this patient and independently examined them on 5/5/2022 as outlined below:          Constitutional:  No acute distress, cooperative, pleasant    ENT:  Oral mucosa moist, oropharynx benign. Resp:  CTA bilaterally. No wheezing/rhonchi/rales. No accessory muscle use. CV:  Regular rhythm, normal rate, no murmurs, gallops, rubs    GI:  Soft, non distended, non tender. normoactive bowel sounds, no hepatosplenomegaly     Musculoskeletal:  No edema, warm, 2+ pulses throughout    Neurologic:  Moves all extremities.   AAOx3, CN II-XII reviewed            Data Review:    Review and/or order of clinical lab test  Review and/or order of tests in the radiology section of CPT  Review and/or order of tests in the medicine section of CPT      Labs:     Recent Labs     05/05/22 0414 05/04/22  1337   WBC 7.6 8.4   HGB 12.8 14.9   HCT 37.9 45.8    198     Recent Labs     05/05/22 0414 05/04/22 2050 05/04/22  1510   * 135* 130*   K 4.3 4.1 7.6*    106 102   CO2 21 19* 19*   BUN 29* 32* 35*   CREA 1.47* 1.49* 1.85*   * 254* 397*   CA 8.7 9.2 8.2*   MG  --  2.2  --    PHOS  --  3.1  --      Recent Labs     05/05/22 0414 05/04/22 2050 05/04/22  1510   ALT 32 36 42   * 395* 376*   TBILI 0.4 0.5 0.5   TP 6.4 7.5 7.9   ALB 3.0* 3.3* 3.2*   GLOB 3.4 4.2* 4.7*     No results for input(s): INR, PTP, APTT, INREXT in the last 72 hours. No results for input(s): FE, TIBC, PSAT, FERR in the last 72 hours. No results found for: FOL, RBCF   No results for input(s): PH, PCO2, PO2 in the last 72 hours.   Recent Labs     05/05/22 0414 05/04/22 2050    147     Lab Results   Component Value Date/Time    Cholesterol, total 182 08/06/2010 02:11 PM    HDL Cholesterol 38 (L) 08/06/2010 02:11 PM    LDL, calculated 102.4 (H) 08/06/2010 02:11 PM    Triglyceride 208 (H) 08/06/2010 02:11 PM    CHOL/HDL Ratio 4.8 08/06/2010 02:11 PM     Lab Results   Component Value Date/Time    Glucose (POC) 317 (H) 05/05/2022 09:28 PM    Glucose (POC) 296 (H) 05/05/2022 05:03 PM    Glucose (POC) 169 (H) 05/05/2022 01:06 PM    Glucose (POC) 255 (H) 05/05/2022 09:09 AM    Glucose (POC) 310 (H) 05/05/2022 06:45 AM     Lab Results   Component Value Date/Time    Color YELLOW/STRAW 05/04/2022 01:37 PM    Appearance CLEAR 05/04/2022 01:37 PM    Specific gravity 1.020 05/04/2022 01:37 PM    Specific gravity 1.024 10/01/2016 07:00 PM    pH (UA) 5.0 05/04/2022 01:37 PM    Protein TRACE (A) 05/04/2022 01:37 PM    Glucose >1,000 (A) 05/04/2022 01:37 PM    Ketone 15 (A) 05/04/2022 01:37 PM    Bilirubin Negative 05/04/2022 01:37 PM    Urobilinogen 0.2 05/04/2022 01:37 PM    Nitrites Negative 05/04/2022 01:37 PM    Leukocyte Esterase Negative 05/04/2022 01:37 PM    Epithelial cells FEW 05/04/2022 01:37 PM    Bacteria Negative 05/04/2022 01:37 PM    WBC 0-4 05/04/2022 01:37 PM    RBC 0-5 05/04/2022 01:37 PM     CT Results  (Last 48 hours)               05/04/22 1807  CT ABD PELV WO CONT Final result    Impression:  No acute process on noncontrast CT. No nephrolithiasis or hydronephrosis. Narrative:  EXAM: CT ABD PELV WO CONT       INDICATION: Hyperglycemia, weight gain. COMPARISON: None. IV CONTRAST: None. ORAL CONTRAST: None       TECHNIQUE:    Thin axial images were obtained through the abdomen and pelvis. Coronal and   sagittal reformats were generated. CT dose reduction was achieved through use of   a standardized protocol tailored for this examination and automatic exposure   control for dose modulation. The absence of intravenous contrast material reduces the sensitivity for   evaluation of the vasculature and solid organs. FINDINGS:    LOWER THORAX: No significant abnormality in the incidentally imaged lower chest.   LIVER: No mass. BILIARY TREE: Cholecystectomy. CBD is not dilated. SPLEEN: within normal limits. PANCREAS: No inflammation. No significant atrophy. ADRENALS: Unremarkable. KIDNEYS/URETERS: No calculus or hydronephrosis. STOMACH: Unremarkable. SMALL BOWEL: No dilatation or wall thickening. COLON: No dilatation or wall thickening. APPENDIX: Normal.   PERITONEUM: No ascites or pneumoperitoneum. RETROPERITONEUM: No lymphadenopathy or aortic aneurysm. REPRODUCTIVE ORGANS: Normal sized prostate gland. URINARY BLADDER: No mass or calculus. BONES: Sacral Paget's disease. ABDOMINAL WALL: No mass or hernia.    ADDITIONAL COMMENTS: N/A                 Medications Reviewed:     Current Facility-Administered Medications   Medication Dose Route Frequency    insulin glargine (LANTUS) injection 15 Units  15 Units SubCUTAneous DAILY    insulin lispro (HUMALOG) injection   SubCUTAneous AC&HS    insulin lispro (HUMALOG) injection 5 Units  5 Units SubCUTAneous TIDAC    dextrose 10 % infusion 125-250 mL  125-250 mL IntraVENous PRN    sodium chloride (NS) flush 5-40 mL  5-40 mL IntraVENous Q8H    sodium chloride (NS) flush 5-40 mL  5-40 mL IntraVENous PRN    glucose chewable tablet 16 g  4 Tablet Oral PRN    dextrose 10 % infusion 0-250 mL  0-250 mL IntraVENous PRN    glucagon (GLUCAGEN) injection 1 mg  1 mg IntraMUSCular PRN    sodium bicarbonate (8.4%) 100 mEq in 0.45% sodium chloride 1,000 mL infusion   IntraVENous CONTINUOUS     ______________________________________________________________________  EXPECTED LENGTH OF STAY: - - -  ACTUAL LENGTH OF STAY:          1                 Sonja Avendano MD

## 2022-05-06 NOTE — DIABETES MGMT
[address] [place] [address]    CLINICAL NURSE SPECIALIST CONSULT     Initial Presentation   Yani Vega is a 46 y.o. male who presented to the ED via urgent care with increased thirst, urination, fatigue, and elevated BG. Initial labs significant for a , K 5.7, GFR 25, Alk vangie 430. A1C 11.4%. UA with 1000+ glucosuria, small ketones. He was given calcium gluconate/IV insulin/Dextrose in the ER and started on a bicarb gtt. And admitted for medical mgt. HX:   Past Medical History:   Diagnosis Date    Abnormal EKG     Abnormal nuclear cardiac imaging test 11/05/2001    No evidence of ischemia. Minimal anterior scar or artifact. Pos EKG on max EST. Ex time 12:29.      Gout     History of echocardiogram 05/23/2012    Tech difficult. EF 60-65%. No WMA. Mod LVH. Mild LAE. No significant valvular heart disease. 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 Patent coronary arteries. LVEDP 25.  EF 65%. RA 7.  RV 41/7. PA 30/14. W 12.  CO 5.6.  Tilt table evaluation 11/02/2001    Negative study in resting state.  Vitamin D deficiency         INITIAL DX:   Hyperkalemia [E87.5] Hyperglycemia     Current Treatment     TX: IV insulin/Dextrose, Kayexalate, Nephrology consultation     Consulted by Darlene Barroso MD  for advanced diabetes nursing assessment and care for:   [x] Home management assessment    Hospital Course   Clinical progress has been uncomplicated. Diabetes History   Type 2 Diabetes: Diagnosed in 2016. Ambulatory BG management provided by: Suzanna Ray MD      Diabetes-related Medical History  Microvascular disease  Nephropathy  Macrovascular disease  CAD  Other associated conditions     Cardiomyopathy    Diabetes Medication History  Key Antihyperglycemic Medications     Patient is on no antihyperglycemic meds.            Diabetes self-management practices:   Eating patten  [x] Breakfast Eggs/Turkey sausage  [x] Lunch  [place]  [place] ([facility]) sandwich, Apple  [x] Dinner  Roderickolfo Dakin, [employer], Chicken in the air fryer, veggie  [x] Bedtime None  [x] Snacks Limited  [x] Beverages Water  Physical activity pattern  Used to be very active with work, over 18K steps daily  Last 6 months \"I have done absolutely nothing\"  Monitoring pattern  Doesn't check BG    Social determinants of health impacting diabetes self-management practices   Concerned that you need to know more about how to stay healthy with diabetes    Overall evaluation:    [x] Not achieving A1c target with drug therapy & self-care practices    Subjective   My A1C was 6 in the fall. 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 Diagnosed with diabetes in 2016 after an ED visit for symptomatic hyperglycemia  He was temporarily on insulin (1-2 mos), but lost weight and made lifestyle changes and was ultimately taken off of the insulin. He reports that his last physical revealed a A1c of approximately 6 (past fall) and he has not been on any medication for diabetes. Endorses weight gain (20lbs) over the last several months which he attributes to [employer] and limited activity\"  Objective   Physical exam  General Obese AA male in no acute distress/ill-appearing. Conversant and cooperative. Daughter at bedside  Neuro  Alert, oriented   Vital Signs   Visit Vitals  /80 (BP 1 Location: Left upper arm)   Pulse (!) 51   Temp 98.6 °F (37 °C)   Resp 20   Ht 6' (1.829 m)   Wt 115 kg (253 lb 8.5 oz)   SpO2 97%   BMI 34.38 kg/m²     Skin  Warm and dry. Acanthosis noted along neckline. Heart   Regular rate and rhythm.  No murmurs, rubs or gallops  Lungs  Clear to auscultation without rales or rhonchi  Extremities No foot wounds      Laboratory  Recent Labs     22  0414 220 22  1510 22  1337 22  1337   * 254* 397*   < > 461*   AGAP 9 10 9   < > 14   WBC 7.6  --   --   --  8.4   CREA 1.47* 1.49* 1.85*   < > 1.99*   GFRNA 50* 49* 38*   < > 35*   AST 19 21 54*   < > 28   ALT 32 36 42   < > 44    < > = values in this interval not displayed. Blood glucose pattern      Significant diabetes-related events over the past 24-72 hours  A1C 11.4%  UCR 27  Fasting B  Pre-prandial B-317  Basal: 35 units glargine  Bolus: 5 humalog/meal  Correction: 18 units in the last 24h      Assessment and Plan   Nursing Diagnosis Risk for unstable blood glucose pattern   Nursing Intervention Domain 5250 Decision-making Support   Nursing Interventions Examined current inpatient diabetes/blood glucose control   Explored factors facilitating and impeding inpatient management  Explored corrective strategies with patient and responsible inpatient provider   Informed patient of rational for insulin strategy while hospitalized     Nursing Diagnosis 18694 Ineffective Health Management   Nursing Intervention Domain 5250 Decision-makingSupport   Nursing Interventions Identified diabetes self-management practices impeding diabetes control  Discussed diabetes survival skills related to  1. Healthy Plate eating plan; given handouts  2. Role of physical activity in improving insulin sensitivity and action  3. Procedure for blood glucose monitoring   4. Medications plan at discharge     Evaluation   Natasha Chen, is a 46year old gentleman, with a history of Type 2 Diabetes previously managed with diet and activity, admitted with hyperglycemia and elevated A1C. BG on admission was 430 and A1C%. Patient endorses lifestyle changes over the last 6 months including high carbohydrate intake, weight gain and limited activity. At this time, patient will need to resume antihyperglycemic therapy and lifestyle changes. Very low dose glargine and correctional humalog has started and titrated to moderate dose weight based basal/ low dose bolus therapy. He did have glucose response to adjustments but will continued to be refined to target an inpatient BG goal of 140-180mg/dl. Please continue to include patient is diabetes management care plan while in the hospital and upon discharge home. Recommendations   1. POC glucose ACHS    2. Consistent carbohydrate diet (60 grams CHO/meal)    3. Start Metformin 1000mg once daily    4. Adjust the subcutaneous Insulin Order set (7922)  Insulin Dosing Specific recommendation   Basal                                      (Based on weight, BMI & GFR) Please increase Lantus to 40 units   Lantus at bed    [x] 0.4 units/kg/D (moderate dose): 40 units daily    Nutritional                                      (Based on CHO/dextrose load) [x] Moderate sensitivity: 8 units Humalog/meal   Hold if patient is NPO or consumes less than 50% of carbohydrates on meal tray   Advance by 2 units daily for persistent pre-prandial hyperglycemia     Corrective                                       (Useful in adjusting insulin dosing) [x] Normal sensitivity: ACHS        Please encourage patient to participate in diabetes self care while in the hospital including allowing patient to use lancet for blood glucose monitoring and self-administer insulin injections. Discharge Recommendations   1. Will need a FUV with PCP within 1-2 weeks after hospital discharge for ongoing diabetes management     2. On Discharge, please place an outpatient order for \"diabetes education\" (enter as REF20). This will trigger a referral for the Program [facility] Diabetes [facility] which includes outpatient diabetes self management training with a certified diabetes educator. 3. Metformin  mg: Start with 1000 mg Daily; monitor for GI side effects. Side effects should resolve after 5-7 days.   If no GI side effects- advance dose by 1 tablet every 5-7 days to a total dose of 1000 mg BID. 4. Glargine PEN: 40 units at bedtime    5. Humalog PEN: 8 units Humalog/meal.  No additional correctional humalog. 6. Pen Needle, Diabetic 32 Gauge x 1/4\" (1 box)    Billing Code(s)   [x] 69277   Before making these care recommendations, I personally reviewed the hospitalization record, including notes, laboratory & diagnostic data and current medications, and examined the patient at the bedside (circumstances permitting) before making care recommendations. More than fifty (50) percent of the time was spent in patient counseling and/or care coordination.   Total minutes: 36      GRACE Larson  Diabetes Clinical Nurse Specialist  [facility]  Access via [employer]

## 2022-05-06 NOTE — PROGRESS NOTES
Attempted to schedule hospital follow up PCP appointment. Office closes at 12:00 p.m. on Fridays. Juan Webster, Care Management Assistant.

## 2022-05-06 NOTE — PROGRESS NOTES
[address] May 6, 2022       RE: Elsy Crowell      To Whom It May Concern,    This Letter is to serve as a work excuse note to certify that Elsy Crowell ( 66) should be excused from [facility] [employer] [facility]- through [employer] 5/15/22 due to an acute medical issue that required hospitalization at [facility] in [facility]. He should be cleared medically to return to work without restrictions on 22. Thank you for your assistance in this matter.       Sincerely,  Myriam Ledbetter MD   Adult Inpatient Hospitalist  [facility]

## 2022-05-16 NOTE — PROGRESS NOTES
Physician Progress Note      Raine Hernandez  [facility] #:                  [id number]  :                       1970  ADMIT DATE:       2022 12:24 PM  [address] [place] DATE:        2022 5:50 PM  RESPONDING  PROVIDER #:        Doris Daniel MD          QUERY TEXT:    Noted documentation of Acute Kidney Injury in pt with CKD 3 with Creat 1.99 on admission, and documented Creat baseline of 1.4. In order to support the diagnosis of SHAYLEE, please include additional clinical indicators in your documentation. ? Or please document if the diagnosis of SHAYLEE has been ruled out after further study. The medical record reflects the following:  Risk Factors: CKD, DM  Clinical Indicators: admitted with dry mouth, frequent urination and elevated glucose. Pt noted to have Dehydration, Uncontrolled DM, and SHAYLEE on CKD 3 documented. Creat 1.99 on admission, and improved to 1.43 at time of discharge with Baseline Creat documented to be 1.4. This does not meet KDIGO criteria as described below. Treatment: 1L NS IV Bolus x2, monitoring of labs, Renal consult. Defined by Kidney Disease Improving Global Outcomes (KDIGO) clinical practice guideline for acute kidney injury:  -Increase in SCr by greater than or equal to 0.3 mg/dl within 48 hours; or  -Increase or decrease in SCr to greater than or equal to 1.5 times baseline, which is known or presumed to have occurred within the prior 7 days; or  -Urine volume < 0.5ml/kg/h for 6 hours. Thank you,  Blanka Mora RN  Clinical Documentation  [phone]  Options provided:  -- Acute kidney injury evidenced by, Please document evidence as well as a numerical baseline creatinine, if known.   -- Acute kidney injury ruled out after study  -- Other - I will add my own diagnosis  -- Disagree - Not applicable / Not valid  -- Disagree - Clinically unable to determine / Unknown  -- Refer to Clinical Documentation Reviewer    PROVIDER RESPONSE TEXT:    Acute kidney injury was ruled out after study.     Query created by: Refugio Woodard on 5/9/2022 7:03 AM      Electronically signed by:  Charleen Lo MD 5/16/2022 6:35 AM

## 2022-06-27 ENCOUNTER — CLINICAL SUPPORT (OUTPATIENT)
Dept: DIABETES SERVICES | Age: 52
End: 2022-06-27
Payer: COMMERCIAL

## 2022-06-27 DIAGNOSIS — E11.65 TYPE 2 DIABETES MELLITUS WITH HYPERGLYCEMIA, WITHOUT LONG-TERM CURRENT USE OF INSULIN (HCC): ICD-10-CM

## 2022-06-27 PROCEDURE — G0108 DIAB MANAGE TRN  PER INDIV: HCPCS

## 2022-06-27 NOTE — Clinical Note
Yannick LÓPEZ   Please see my note  .  Pt states he stop his insulin due to bs at goal.    Thank you,   [facility]

## 2022-06-27 NOTE — PROGRESS NOTES
[facility] Program for Diabetes Health  Diabetes Self-Management Education & Support Program    Reason for Referral: DSMES  Referral Source: Sidra Bradley MD  Services requested: DSMES       ASSESSMENT    From my perspective, the participant would benefit from [facility] specifically related to reducing risks, healthy eating, monitoring, taking medications, physical activity, healthy coping and problem solving. Will adapt DSMES program to build on participant's skills score, confidence score and preparedness score as noted in the Diabetes Skills, Confidence, and Preparedness Index. During the program, we will focus on providing DSMES that specifically addresses participant's interest in healthy eating, as shown by their reported readiness to change. NOTE:   MEDICATIONS: humalog and lantus but not taking it since notice bs were getting to the goal.   Patient is doing 15, 000 steps daily , 7 days a week   States Lost 20 lbs . Per pt report blood sugars:   Bs today =106  6/26/22 Bs= 11:10 am - 88                       10:53 am =96  6/25/22= 11:44pm- 129  6/23/22 = 10:57 am = 108                   4:34 pm- 129      The participant would be best served by attending weekly individual sessions. Diabetes Self-Management Education Follow-up Visit: 7/21/22       Clinical Presentation  Dionisio Fajardo is a 46 y.o.  male referred for diabetes self-management education. Participant has Type 2 DM not on insulin (pt stop the insulin , bs are at goal without insulin, for 1-10 years-. Family history positive (father, grandfather) for diabetes. Patient reports receiving DSMES services in the past. Most recent A1c value:   Lab Results   Component Value Date/Time    Hemoglobin A1c 11.4 (H) 05/04/2022 08:50 PM       Diabetes-related medical history:  None   Diabetes-related medications:  Current dosing:   Key Antihyperglycemic Medications     Patient is on no antihyperglycemic meds.           Blood Pressure Management  Key ACE/ARB Medications     Patient is on no ACE or ARB meds. Lipid Management  Key Antihyperlipidemia Meds     The patient is on no antihyperlipidemia meds. Clot Prevention  Key Anti-Platelet Anticoagulant Meds     The patient is on no antiplatelet meds or anticoagulants. Learning Assessment  Learning objectives Educator assessment (6/27/2022)   Diabetes Disease Process  The participant can   A) describe diabetes in basic terms;   B) state the type of diabetes they have; &   C) state accepted blood glucose targets. Healthy Eating  The participant can   A) identify carbohydrate foods; &   B) accurately read food labels. Being Active  The participant can  A) state the benefits of physical activity;  B) report their current PA practices;  C) identify PA they would consider incorporating in their lives; &  D) develop an implementation plan. Monitoring  The participant can  A) operate their blood glucose meter; &  B) describe how they log their blood glucoses to share with their provider. Taking Medications  The participant can  A) name their diabetes medications;  B) state the purpose and dose;  C) note side effects; &  D) describe proper storage, disposal & transport (if appropriate). Healthy Coping  The participant can    A) describe their response to diabetes diagnosis; B) describe their specific coping mechanisms;  C) identify supportive people and/or other resources that positively support their diabetes self-care and health. Reducing Risks  The participant can describe the preventive measures used by providers to promote health and prevent diabetes complications. Problem Solving  The participant can   A) identify signs, symptoms & treatment of hypoglycemia;    B) identify signs, symptoms & treatment of hyperglycemia;  C) describe their sick day plan; &  D) identify BG patterns to discuss with their provider.        Yes, \"is a disease cause by elevated blood glucose,  high or low , and the pancreas is not producing insulin\"  Yes  No, educated patient           Yes  Yes        Yes  Yes, walking 10- 15,000 steps for 5 days , weekends 10, 000 steps. n/a  n/a          Yes  Yes        Yes, humalog and lantus but not taking since notice bs were getting to the goal  Yes  Yes  Yes      Yes, \"was sad at first but not discourage since he can control bs\"  Yes  Yes, daughter         Yes            Yes  Yes  Yes  Yes     Characteristics to Learning   Barriers to Learning      None     Favorite Ways to Learn   [] Lecture  [] Slides  [] Reading [x] Video-Internet  [] Cassettes/CDs/MP3's  [] Interactive Small Groups [] Other       Behavioral Assessment  Current self-care practices  Educator assessment (6/27/2022)   Healthy Eating   Current practices    24-hour Dietary Recall:  Breakfast: 8-10 am - 3 eggs, scramble, pc of fruit ; sometimes turkey grijalva , water   Lunch: 12-1pm-  [place] [facility]) sandwich , mustard , pc of fruit, water   Dinner: 6-7pm - salmon, bake chicken, or bake fish, vegetables, states no starch   Snacks: cachews , peanuts or fruit   Beverages: water   Alcohol: no, recently stop     Pt prepare his meals and do grocery shopping. Would benefit from [facility] related to Healthy Eating: Yes    Eats a carbohydrate controlled diet: No    Stage of change: Preparation      Being Active  Current practices  How many days during the past week have you performed physical activity where your heart beats faster and your breathing is harder than normal for 30 minutes or more?  7 day(s)    How many days in a typical week do you perform activity such as this?  7 day(s)     Would benefit from [facility] related to Being Active: Yes}      Exercises 150 minutes/week: Yes      Stage of change: Preparation    Patient is doing 15, 000 steps daily , 7 days a week   States Lost 20 lbs .      Patient is a retire      Monitoring  Current practices  Do you monitor your blood sugar? Yes    How often do you monitor? 2x/day    Whare the range of readings? *  Bs today =106  6/26/22 Bs= 11:10 am - 88                       10:53 am =96  6/25/22= 11:44pm- 129  6/23/22 = 10:57 am = 108                   4:34 pm- 129                    Do you know your last A1c measurement? Yes    Do you know the meaning of the A1c? Yes     Would benefit from [facility] related to Monitoring: Yes      Uses BG readings to establish trends and understand BG patterns: Yes      Stage of change: Preparation        Use meter True metrix    Taking Medication  Current practices  Do you understand what your diabetes medications do? N/A pt stop medication due to bs getting at goal    Would benefit from [facility] related to Taking Medication: Yes         Healthy Coping   Current state  Diabetes Skills, Confidence and Preparedness Index: Total score: 6.9  Skills: 6.9  Confidence: 6.9  Preparedness: 7.0       Would benefit from DSMES related to Healthy Coping: Yes      Identifies specific people, organizations,etc, that actively support their diabetes self-care efforts: Yes      Stage of change: Preparation     Reducing Risks  Current state  Vaccines:  Influenza: There is no immunization history on file for this patient. Pneumococcal:   There is no immunization history on file for this patient. Hepatitis:   There is no immunization history on file for this patient.     Examinations:  Diabetic Foot and Eye Exam HM Status   Topic Date Due    Diabetic Foot Care  Never done    Eye Exam  Never done        Dental exam: march 2022    Foot exam: due    Heart Protection:  BP Readings from Last 2 Encounters:   05/06/22 (!) 144/87   11/18/21 (!) 140/88        Lab Results   Component Value Date/Time    LDL, calculated 102.4 (H) 08/06/2010 02:11 PM        Kidney Protection:  No results found for: MCACR, MCA1, MCA2, MCA3, MCAU, MCAU2, MCALPOCT     Would benefit from [facility] related to Reducing Risks: Yes      Actively participates in decision-making with provider regarding secondary prevention:  Yes      Stage of change: Preparation     Patient states he follows up with a nephrologist.   Lab Results   Component Value Date/Time    Creatinine 1.43 (H) 05/06/2022 04:02 AM        Problem Solving  Current state  Hypoglycemia Management:  What are signs and symptoms of hypoglycemia that you experience: Dizziness/light-headedness, Weakness, confusion    How do you prevent hypoglycemia: consistent meals/snack time and monitor blood sugar before exercise    How do you treat hypoglycemia: Patient is unaware of how to treat hypoglycemia    Hyperglycemia Management:  What are signs and symptoms of hyperglycemia that you experience: Frequent urination, Fatigue    How can you prevent hyperglycemia: focus on carbohydrate counting/meal planning, engage in regular physical activity and monitor blood sugar    Sick Day Management:  What do you do differently on sick days:  Pt reported being unaware of self-management on sick days    Pattern Management:  Do you notice blood glucose patterns when you look at the readings in your meter or logbook? Yes    How do you use the blood glucose readings from your meter or logbook? takes bs numbers to his provider. Would benefit from [facility] related to Problem Solving: Yes      Articulates appropriate strategies to address hypoglycemia, hyperglycemia, sick day care and BG pattern: Yes      Stage of change: Preparation       Note: Content derived from the American Association of Diabetes Educators' Diabetes Education Curriculum: A Guide to Successful Self-Management (3rd edition)      Lopez Engel RN on 6/27/2022 at 11:08 AM    I have personally reviewed the health record, including provider notes, laboratory data and current medications before making these care and education recommendations. The time spent in this effort is included in the total time.   Total minutes: 30      [facility], was evaluated in person at office visit. Diabetes Skills, Confidence & Preparedness Index (SCPI) ©  Thank you for completing the Skills, Confidence & Preparedness Index! Below are your scores. All scales and questions are out of 7. If you would like these results emailed, please enter your email address along with some identifying patient information. Email:    Patient Identifier:        Overall SCPI score: 6.9 Skills Score: 6.9  Low: Healthy Eating(Q1) Confidence Score: 6.9  Low: Reducing Risks(Q3) Preparedness Score: 7.0  Low: Healthy Eating(Q1),Being Active(Q2),Healthy Coping(Q3),Blood Sugar Monitoring(Q6),Problem Solving(Q7)  Healthy Eating Score: 6.8  Low: Skills(Q1) Taking Medication Score: N/A Blood Sugar Monitoring Score: 7.0  Low: Skills(Q3),Skills(Q8),Confidence(Q6),Preparedness(Q6) Reducing Risks Score: 6.7  Low: Confidence(Q3)  Problem Solving Score: 7.0  Low: Skills(Q6),Confidence(Q7),Preparedness(Q7) Healthy Coping Score: 7.0  Low: Skills(Q7),Confidence(Q2),Preparedness(Q3) Being Active Score: 7.0  Low: Confidence(Q5),Preparedness(Q2)    Skills/Knowledge Questions  1. I know how to plan meals that have the best balance between carbohydrates, proteins and vegetables. 6  2. I know how my diabetes medications (pills, injectables and/or insulin) work in my body. 0  3. I know when to check my blood sugar if I want to see how my body responded to a meal. 7  4. I know when to check my blood sugars to determine if my medication or insulin doses are correct. 0  5. I know what to do to prevent a low blood sugar when I exercise (either before, during, or after). 7  6. When I am sick, I know what to do differently with my diabetes management. 7  7. I know how stress can affect my diabetes management. 7  8. When I look at my blood sugars over a given week, I can explain what my blood sugar pattern is. 7  9.  I know what my target levels are for A1c, blood pressure and cholesterol. 7  Confidence Questions  1. I am confident that I can plan balanced meals and snacks. 7  2. I am confident that I can manage my stress. 7  3. I am confident that I can prevent a low blood sugar during or after exercise. 6  4. I am confident that the next time I eat out, I will be able to choose foods that best keep my blood sugars in target. 7  5. I am confident I can include exercise into my schedule. 7  6. I am confident that I can use my daily blood sugars to adjust my diet, my activity, and/or my insulin. 7  7. When something out of my normal routine happens, I am confident that I can problem-solve and keep my diabetes on track. 7  Preparedness Questions  1. Within the next month, I will begin to eat more balanced meals and snacks. 8  2. Within the next month, I will choose an exercise activity and I will start fitting it into my schedule. 8  3. Within the next month, I will make a list of stress management options that work for me. 8  4. Within the next month, I will consistently plan ahead to prevent low blood sugars. 0  5. Within the next month, I will start adjusting my insulin doses on my own. 0  6. Within the next month, I will begin making changes to my diabetes management based on my daily blood sugars (eg - eating, activity and/or insulin). 8  7. Within the next month, I will begin making changes to my diabetes management to meet my overall goals (eg - eating, activity and/or insulin).  8

## 2022-07-25 ENCOUNTER — TELEPHONE (OUTPATIENT)
Dept: DIABETES SERVICES | Age: 52
End: 2022-07-25

## 2022-07-25 NOTE — TELEPHONE ENCOUNTER
[address]  07/25/22   9:54 AM    Patient had appt at 9:15 am .   Per PSR, pt answer for check in but hang up. I sent text x 2. [facility] , and reminder of next appt booked for 7/29. Pt cancelled on 7/21/22    Yessica Garcia RN CDCES        Copy of the message from [facility], pt its been cancelling or no show to the appt for DM class. Orion Crocker, AURROA Ruiz,     This patient R/S his appointment from tomorrow, 7/21/22 to 8/03/22. He already has an appointment scheduled on 7/25/22.      Thank you,     Elizabeth Bo

## 2022-08-03 ENCOUNTER — TELEPHONE (OUTPATIENT)
Dept: DIABETES SERVICES | Age: 52
End: 2022-08-03

## 2022-08-03 NOTE — TELEPHONE ENCOUNTER
PROGRAM FOR DIABETES HEALTH   08/03/22  8:47 AM      Patient have a virtual appt today for DM education . I sent 2 texts and no response. I called patient and [address] [facility]. Ms white covering for PSR , was unable to reach patient as she states.          Marciano BERNARDO

## 2022-10-21 ENCOUNTER — PATIENT MESSAGE (OUTPATIENT)
Dept: CARDIOLOGY CLINIC | Age: 52
End: 2022-10-21

## 2023-06-16 PROBLEM — I10 ESSENTIAL (PRIMARY) HYPERTENSION: Status: ACTIVE | Noted: 2021-10-28

## 2023-06-16 PROBLEM — I44.0 FIRST DEGREE AV BLOCK: Status: ACTIVE | Noted: 2023-06-16

## 2023-06-16 PROBLEM — R94.31 ABNORMAL EKG: Status: ACTIVE | Noted: 2023-06-16

## 2023-06-16 PROBLEM — I42.2 OTHER HYPERTROPHIC CARDIOMYOPATHY (HCC): Status: ACTIVE | Noted: 2021-10-28

## 2023-12-27 ENCOUNTER — HOSPITAL ENCOUNTER (EMERGENCY)
Facility: HOSPITAL | Age: 53
Discharge: HOME | End: 2023-12-27
Attending: STUDENT IN AN ORGANIZED HEALTH CARE EDUCATION/TRAINING PROGRAM
Payer: COMMERCIAL

## 2023-12-27 VITALS
TEMPERATURE: 98.2 F | SYSTOLIC BLOOD PRESSURE: 135 MMHG | RESPIRATION RATE: 16 BRPM | HEART RATE: 70 BPM | OXYGEN SATURATION: 97 % | BODY MASS INDEX: 33.86 KG/M2 | WEIGHT: 250 LBS | DIASTOLIC BLOOD PRESSURE: 84 MMHG | HEIGHT: 72 IN

## 2023-12-27 DIAGNOSIS — M54.30 SCIATICA, UNSPECIFIED LATERALITY: Primary | ICD-10-CM

## 2023-12-27 PROCEDURE — 6370000000 HC RX 637 (ALT 250 FOR IP): Performed by: STUDENT IN AN ORGANIZED HEALTH CARE EDUCATION/TRAINING PROGRAM

## 2023-12-27 PROCEDURE — 99284 EMERGENCY DEPT VISIT MOD MDM: CPT

## 2023-12-27 PROCEDURE — 6360000002 HC RX W HCPCS: Performed by: STUDENT IN AN ORGANIZED HEALTH CARE EDUCATION/TRAINING PROGRAM

## 2023-12-27 PROCEDURE — 96372 THER/PROPH/DIAG INJ SC/IM: CPT

## 2023-12-27 RX ORDER — LIDOCAINE 4 G/G
1 PATCH TOPICAL DAILY
Qty: 30 PATCH | Refills: 0 | Status: SHIPPED | OUTPATIENT
Start: 2023-12-27 | End: 2024-01-26

## 2023-12-27 RX ORDER — METHOCARBAMOL 500 MG/1
750 TABLET, FILM COATED ORAL ONCE
Status: COMPLETED | OUTPATIENT
Start: 2023-12-27 | End: 2023-12-27

## 2023-12-27 RX ORDER — OXYCODONE HYDROCHLORIDE 5 MG/1
5 TABLET ORAL
Status: COMPLETED | OUTPATIENT
Start: 2023-12-27 | End: 2023-12-27

## 2023-12-27 RX ORDER — LIDOCAINE 4 G/G
2 PATCH TOPICAL
Status: DISCONTINUED | OUTPATIENT
Start: 2023-12-27 | End: 2023-12-27 | Stop reason: HOSPADM

## 2023-12-27 RX ORDER — ACETAMINOPHEN 500 MG
1000 TABLET ORAL
Status: COMPLETED | OUTPATIENT
Start: 2023-12-27 | End: 2023-12-27

## 2023-12-27 RX ORDER — METHOCARBAMOL 750 MG/1
750 TABLET, FILM COATED ORAL 4 TIMES DAILY PRN
Qty: 20 TABLET | Refills: 0 | Status: SHIPPED | OUTPATIENT
Start: 2023-12-27 | End: 2024-01-06

## 2023-12-27 RX ORDER — KETOROLAC TROMETHAMINE 30 MG/ML
30 INJECTION, SOLUTION INTRAMUSCULAR; INTRAVENOUS ONCE
Status: COMPLETED | OUTPATIENT
Start: 2023-12-27 | End: 2023-12-27

## 2023-12-27 RX ADMIN — OXYCODONE 5 MG: 5 TABLET ORAL at 20:16

## 2023-12-27 RX ADMIN — ACETAMINOPHEN 1000 MG: 500 TABLET ORAL at 20:16

## 2023-12-27 RX ADMIN — KETOROLAC TROMETHAMINE 30 MG: 30 INJECTION, SOLUTION INTRAMUSCULAR; INTRAVENOUS at 20:16

## 2023-12-27 RX ADMIN — METHOCARBAMOL TABLETS 750 MG: 500 TABLET, COATED ORAL at 20:16

## 2023-12-27 NOTE — ED TRIAGE NOTES
Pt arrives with c/o \"sciatic nerve pain\" for a few days on L side. Pt reports that this happened earlier in the year as well. Pt was unable to go to chiropractor today due to pain.

## 2023-12-28 NOTE — FLOWSHEET NOTE
I have reviewed discharge instructions with the patient and spouse. The patient and spouse verbalized understanding. Work note provided.

## 2023-12-28 NOTE — ED PROVIDER NOTES
[id number]  [phone]    As needed, If symptoms worsen      DISCHARGE MEDICATIONS:  Discharge Medication List as of 12/27/2023  9:08 PM        START taking these medications    Details   methocarbamol (ROBAXIN-750) 750 MG tablet Take 1 tablet by mouth 4 times daily as needed (Pain or spasm), Disp-20 tablet, R-0Normal      lidocaine 4 % external patch Place 1 patch onto the skin daily, TransDERmal, DAILY Starting Wed 12/27/2023, Until Fri 1/26/2024, For 30 days, Disp-30 patch, R-0, Normal             Signed By: Guillermina Piedra MD     December 28, 2023        (Please note that portions of this note were completed with a voice recognition program.  Efforts were made to edit the dictations but occasionally words are mis-transcribed.)         Nancy Marvin MD  12/28/23 [id number]

## 2024-04-01 ENCOUNTER — HOSPITAL ENCOUNTER (EMERGENCY)
Facility: HOSPITAL | Age: 54
Discharge: HOME | End: 2024-04-01
Attending: EMERGENCY MEDICINE
Payer: COMMERCIAL

## 2024-04-01 VITALS
BODY MASS INDEX: 33.13 KG/M2 | WEIGHT: 250 LBS | DIASTOLIC BLOOD PRESSURE: 100 MMHG | OXYGEN SATURATION: 96 % | SYSTOLIC BLOOD PRESSURE: 160 MMHG | HEART RATE: 86 BPM | TEMPERATURE: 98.3 F | HEIGHT: 73 IN | RESPIRATION RATE: 18 BRPM

## 2024-04-01 DIAGNOSIS — R73.9 HYPERGLYCEMIA WITHOUT KETOSIS: Primary | ICD-10-CM

## 2024-04-01 DIAGNOSIS — E11.65 POORLY CONTROLLED TYPE 2 DIABETES MELLITUS (HCC): ICD-10-CM

## 2024-04-01 LAB
ANION GAP SERPL CALC-SCNC: 15 MMOL/L (ref 5–15)
BASE DEFICIT BLDV-SCNC: 3.4 MMOL/L
BASOPHILS # BLD: 0.1 K/UL (ref 0–0.1)
BASOPHILS NFR BLD: 1 % (ref 0–1)
BUN SERPL-MCNC: 29 MG/DL (ref 6–20)
BUN/CREAT SERPL: 18 (ref 12–20)
CALCIUM SERPL-MCNC: 10 MG/DL (ref 8.6–10)
CHLORIDE SERPL-SCNC: 98 MMOL/L (ref 98–107)
CO2 SERPL-SCNC: 21 MMOL/L (ref 22–29)
COMMENT:: NORMAL
CREAT SERPL-MCNC: 1.58 MG/DL (ref 0.7–1.2)
DIFFERENTIAL METHOD BLD: NORMAL
EOSINOPHIL # BLD: 0.2 K/UL (ref 0–0.4)
EOSINOPHIL NFR BLD: 2 % (ref 0–7)
ERYTHROCYTE [DISTWIDTH] IN BLOOD BY AUTOMATED COUNT: 12.9 % (ref 11.5–14.5)
GLUCOSE BLD STRIP.AUTO-MCNC: 329 MG/DL (ref 65–117)
GLUCOSE BLD STRIP.AUTO-MCNC: 452 MG/DL (ref 65–117)
GLUCOSE SERPL-MCNC: 517 MG/DL (ref 65–100)
HCO3 BLDV-SCNC: 21.8 MMOL/L (ref 23–28)
HCT VFR BLD AUTO: 44.9 % (ref 36.6–50.3)
HGB BLD-MCNC: 15 G/DL (ref 12.1–17)
IMM GRANULOCYTES # BLD AUTO: 0 K/UL (ref 0–0.04)
IMM GRANULOCYTES NFR BLD AUTO: 0 % (ref 0–0.5)
LYMPHOCYTES # BLD: 2 K/UL (ref 0.8–3.5)
LYMPHOCYTES NFR BLD: 23 % (ref 12–49)
MCH RBC QN AUTO: 26.8 PG (ref 26–34)
MCHC RBC AUTO-ENTMCNC: 33.4 G/DL (ref 30–36.5)
MCV RBC AUTO: 80.3 FL (ref 80–99)
MONOCYTES # BLD: 0.7 K/UL (ref 0–1)
MONOCYTES NFR BLD: 8 % (ref 5–13)
NEUTS SEG # BLD: 5.8 K/UL (ref 1.8–8)
NEUTS SEG NFR BLD: 67 % (ref 32–75)
NRBC # BLD: 0 K/UL (ref 0–0.01)
NRBC BLD-RTO: 0 PER 100 WBC
PCO2 BLDV: 38.9 MMHG (ref 41–51)
PH BLDV: 7.36 (ref 7.32–7.42)
PLATELET # BLD AUTO: 204 K/UL (ref 150–400)
PO2 BLDV: 33 MMHG (ref 25–40)
POTASSIUM SERPL-SCNC: 4.6 MMOL/L (ref 3.5–5.1)
RBC # BLD AUTO: 5.59 M/UL (ref 4.1–5.7)
SAO2 % BLDV: 60.6 % (ref 65–88)
SERVICE CMNT-IMP: ABNORMAL
SODIUM SERPL-SCNC: 134 MMOL/L (ref 136–145)
SPECIMEN HOLD: NORMAL
SPECIMEN TYPE: ABNORMAL
WBC # BLD AUTO: 8.7 K/UL (ref 4.1–11.1)

## 2024-04-01 PROCEDURE — 2580000003 HC RX 258: Performed by: EMERGENCY MEDICINE

## 2024-04-01 PROCEDURE — 82962 GLUCOSE BLOOD TEST: CPT

## 2024-04-01 PROCEDURE — 96374 THER/PROPH/DIAG INJ IV PUSH: CPT

## 2024-04-01 PROCEDURE — 6370000000 HC RX 637 (ALT 250 FOR IP): Performed by: EMERGENCY MEDICINE

## 2024-04-01 PROCEDURE — 36415 COLL VENOUS BLD VENIPUNCTURE: CPT

## 2024-04-01 PROCEDURE — 99284 EMERGENCY DEPT VISIT MOD MDM: CPT

## 2024-04-01 PROCEDURE — 85025 COMPLETE CBC W/AUTO DIFF WBC: CPT

## 2024-04-01 PROCEDURE — 82803 BLOOD GASES ANY COMBINATION: CPT

## 2024-04-01 PROCEDURE — 80048 BASIC METABOLIC PNL TOTAL CA: CPT

## 2024-04-01 PROCEDURE — 96361 HYDRATE IV INFUSION ADD-ON: CPT

## 2024-04-01 RX ORDER — 0.9 % SODIUM CHLORIDE 0.9 %
1000 INTRAVENOUS SOLUTION INTRAVENOUS ONCE
Status: COMPLETED | OUTPATIENT
Start: 2024-04-01 | End: 2024-04-01

## 2024-04-01 RX ADMIN — SODIUM CHLORIDE 1000 ML: 9 INJECTION, SOLUTION INTRAVENOUS at 17:04

## 2024-04-01 RX ADMIN — INSULIN HUMAN 10 UNITS: 100 INJECTION, SOLUTION PARENTERAL at 17:05

## 2024-04-01 ASSESSMENT — PAIN - FUNCTIONAL ASSESSMENT: PAIN_FUNCTIONAL_ASSESSMENT: NONE - DENIES PAIN

## 2024-04-01 NOTE — ED TRIAGE NOTES
Pt ambulated to ED.  Pt states having high blood sugar.  Pt states to be pre-diabetic and is managing by diet.  Pt states home reading was a BG of 500.  Pt states not eating a lot of sugar and unsure how sugar got this high.  Pt states drinking a lot of water and urinating a lot.

## 2024-04-04 NOTE — ED PROVIDER NOTES
[facility] [place]  EMERGENCY DEPARTMENT ENCOUNTER      Pt Name: Tru Schmitz  MRN: [id number]  Birthdate 1970  Date of evaluation: 4/1/2024  Provider: Richard Coates MD    CHIEF COMPLAINT       Chief Complaint   Patient presents with    Hyperglycemia         HISTORY OF PRESENT ILLNESS    53-year-old male presents with high blood sugars that he has been managing with diet and lifestyle but took a home reading where it was above 500 today.  He has had polyuria and polydipsia.  Has a documented history of diabetes and at one point was well-controlled with diet.            Review of External Medical Records:     Nursing Notes were reviewed.    REVIEW OF SYSTEMS       Review of Systems    Except as noted above the remainder of the review of systems was reviewed and negative.       PAST MEDICAL HISTORY     Past Medical History:   Diagnosis Date    Abnormal EKG     Abnormal nuclear cardiac imaging test 11/05/2001    No evidence of ischemia.  Minimal anterior scar or artifact.  Pos EKG on max EST.  Ex time 12:29.      Gout     History of echocardiogram 05/23/2012    Tech difficult.  EF 60-65%.  No WMA.  Mod LVH.  Mild LAE.  No significant valvular heart disease.    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  Patent coronary arteries.  LVEDP 25.  EF 65%.  RA 7.  RV 41/7.  PA 30/14.  W 12.  CO 5.6.    Tilt table evaluation 11/02/2001    Negative study in resting state.    Vitamin D deficiency          SURGICAL HISTORY       Past Surgical History:   Procedure Laterality Date    CARDIAC CATHETERIZATION  10/31/2011    CHOLECYSTECTOMY  April 2001    OTHER SURGICAL HISTORY  1983    Right knee surgery    REMOVAL GALLBLADDER  4/2000         CURRENT MEDICATIONS       Discharge Medication List as of 4/1/2024  6:31 PM        CONTINUE these medications which have NOT CHANGED    Details   amLODIPine (NORVASC) 5 MG tablet Take